# Patient Record
Sex: FEMALE | Race: WHITE | NOT HISPANIC OR LATINO | Employment: FULL TIME | URBAN - METROPOLITAN AREA
[De-identification: names, ages, dates, MRNs, and addresses within clinical notes are randomized per-mention and may not be internally consistent; named-entity substitution may affect disease eponyms.]

---

## 2017-03-28 ENCOUNTER — TELEPHONE (OUTPATIENT)
Dept: PULMONOLOGY | Facility: HOSPICE | Age: 42
End: 2017-03-28

## 2017-03-28 DIAGNOSIS — R06.00 DYSPNEA, UNSPECIFIED TYPE: ICD-10-CM

## 2019-04-26 ENCOUNTER — OFFICE VISIT (OUTPATIENT)
Dept: URGENT CARE | Facility: PHYSICIAN GROUP | Age: 44
End: 2019-04-26
Payer: MEDICARE

## 2019-04-26 VITALS
DIASTOLIC BLOOD PRESSURE: 76 MMHG | TEMPERATURE: 98.6 F | OXYGEN SATURATION: 96 % | RESPIRATION RATE: 16 BRPM | WEIGHT: 180 LBS | SYSTOLIC BLOOD PRESSURE: 110 MMHG | HEART RATE: 80 BPM | BODY MASS INDEX: 29.95 KG/M2

## 2019-04-26 DIAGNOSIS — S91.112A LACERATION OF LEFT GREAT TOE WITHOUT FOREIGN BODY PRESENT OR DAMAGE TO NAIL, INITIAL ENCOUNTER: ICD-10-CM

## 2019-04-26 PROCEDURE — 90715 TDAP VACCINE 7 YRS/> IM: CPT | Performed by: PHYSICIAN ASSISTANT

## 2019-04-26 PROCEDURE — 90471 IMMUNIZATION ADMIN: CPT | Performed by: PHYSICIAN ASSISTANT

## 2019-04-26 PROCEDURE — 12002 RPR S/N/AX/GEN/TRNK2.6-7.5CM: CPT | Mod: TA | Performed by: PHYSICIAN ASSISTANT

## 2019-04-26 RX ORDER — HYDROXYZINE HYDROCHLORIDE 25 MG/1
TABLET, FILM COATED ORAL
Refills: 2 | COMMUNITY
Start: 2019-03-22 | End: 2019-06-11 | Stop reason: SDUPTHER

## 2019-04-26 RX ORDER — AMOXICILLIN AND CLAVULANATE POTASSIUM 875; 125 MG/1; MG/1
1 TABLET, FILM COATED ORAL 2 TIMES DAILY
Qty: 10 TAB | Refills: 0 | Status: SHIPPED | OUTPATIENT
Start: 2019-04-26 | End: 2019-05-01

## 2019-04-26 RX ORDER — FLUOXETINE HYDROCHLORIDE 60 MG/1
TABLET, FILM COATED ORAL; ORAL
Refills: 3 | COMMUNITY
Start: 2019-03-22 | End: 2019-06-11 | Stop reason: SDUPTHER

## 2019-04-26 RX ORDER — SIMVASTATIN 40 MG
TABLET ORAL
Refills: 3 | COMMUNITY
Start: 2019-03-22 | End: 2019-06-11 | Stop reason: SDUPTHER

## 2019-04-26 RX ORDER — MONTELUKAST SODIUM 10 MG/1
TABLET ORAL
Refills: 5 | COMMUNITY
Start: 2019-03-22 | End: 2019-06-11 | Stop reason: SDUPTHER

## 2019-04-26 RX ORDER — FUROSEMIDE 20 MG/1
TABLET ORAL
Refills: 5 | COMMUNITY
Start: 2019-03-22 | End: 2019-06-11 | Stop reason: SDUPTHER

## 2019-04-26 RX ORDER — CYCLOBENZAPRINE HCL 10 MG
TABLET ORAL
Refills: 6 | COMMUNITY
Start: 2019-03-22 | End: 2019-06-11 | Stop reason: SDUPTHER

## 2019-04-26 ASSESSMENT — PAIN SCALES - GENERAL: PAINLEVEL: 3=SLIGHT PAIN

## 2019-04-26 NOTE — PROGRESS NOTES
Chief Complaint   Patient presents with   • Laceration     Left big toe, patient kicked a door        HISTORY OF PRESENT ILLNESS: Patient is a 44 y.o. female who presents today for the following:    Patient comes in for evaluation of a laceration on her left just prior to arrival.  Her toe got caught under a door that was being opened.  Patient denies distal paresthesias.  Her last tetanus shot is unknown.    Patient Active Problem List    Diagnosis Date Noted   • Bipolar disorder ([facility]) 01/07/2013   • COPD (chronic obstructive pulmonary disease) ([facility]) 11/08/2011   • Fibrocystic breast disease 11/08/2011   • Hx of cervical malignancy 11/08/2011   • Dyspepsia 11/08/2011       Allergies:Nkda [no known drug allergy]    Current Outpatient Prescriptions Ordered in [facility]   Medication Sig Dispense Refill   • BREO ELLIPTA 200-25 MCG/INH AEROSOL POWDER, BREATH ACTIVATED   6   • simvastatin (ZOCOR) 40 MG Tab   3   • hydrOXYzine HCl (ATARAX) 25 MG Tab   2   • FLUoxetine HCl 60 MG Tab   3   • cyclobenzaprine (FLEXERIL) 10 MG Tab   6   • montelukast (SINGULAIR) 10 MG Tab   5   • furosemide (LASIX) 20 MG Tab   5   • NEXIUM 40 MG delayed-release capsule   3   • amoxicillin-clavulanate (AUGMENTIN) 875-125 MG Tab Take 1 Tab by mouth 2 times a day for 5 days. 10 Tab 0   • albuterol (VENTOLIN OR PROVENTIL) 108 (90 BASE) MCG/ACT AERS Inhale 2 Puffs by mouth every 6 hours as needed for Shortness of Breath. (Patient not taking: Reported on 4/26/2019) 1 Inhaler 0   • fluticasone-salmeterol (ADVAIR) 500-50 MCG/DOSE AEPB Inhale 1 Puff by mouth every 12 hours. (Patient not taking: Reported on 4/26/2019) 1 Inhaler 1   • tiotropium (SPIRIVA HANDIHALER) 18 MCG CAPS Inhale 1 Cap by mouth every day. (Patient not taking: Reported on 4/26/2019) 30 Cap 5   • esomeprazole (NEXIUM) 20 MG capsule Take 1 Cap by mouth every morning before breakfast. (Patient not taking: Reported on 4/26/2019) 30 Cap 5   • doxycycline (VIBRAMYCIN) 100 MG TABS Take 1 Tab by  mouth 2 times a day. (Patient not taking: Reported on 2019) 20 Tab 0   • methylPREDNISolone (MEDROL, PADMA,) 4 MG tablet follow package directions (Patient not taking: Reported on 2019) 21 Tab 0     No current Epic-ordered facility-administered medications on file.        Past Medical History:   Diagnosis Date   • Asthma    • COPD (chronic obstructive pulmonary disease) 2011   • Fibrocystic breast    • Fibrocystic breast disease 2011   • Hx of cervical malignancy 2011       Social History   Substance Use Topics   • Smoking status: Former Smoker     Packs/day: 0.50     Quit date: 2011   • Smokeless tobacco: Not on file   • Alcohol use Yes      Comment: rare       Family Status   Relation Status   • Mo Alive   • Fa Alive   • Sis Alive   • Bro Alive   • Sis Alive   • Sis Alive   • Bro    • MGMo      Family History   Problem Relation Age of Onset   • Hypertension Unknown    • Cancer Unknown    • Lung Disease Unknown    • Lung Disease Mother    • Cancer Mother         cervical   • Lung Disease Father    • Cancer Maternal Grandmother         breast       Review of Systems:   Constitutional ROS: No unexpected change in weight, No weakness, No fatigue  Extremities: Laceration left great toe.      Exam:  /76   Pulse 80   Temp 37 °C (98.6 °F) (Temporal)   Resp 16   Wt 81.6 kg (180 lb)   SpO2 96%   General: Well developed, well nourished. No distress.  Pulmonary: Unlabored respiratory effort.    Cardiovascular: Brisk capillary refill in the left great toe.  Extremities: 3.5 cm laceration on the dorsum of the left great toe over the IP joint.  Actively bleeding.  Psych: Normal mood. Alert and oriented x3. Judgment and insight is normal.    LACERATION REPAIR PROCEDURE NOTE  The patient's identification was confirmed and consent was obtained.  This procedure was performed by Sho Zuñiga PA-C at 1600 hrs  Site: Left great toe  Sterile procedures observed  Anesthetic used  (type and amt): 1% lidocaine without epinephrine, 8 mL  Suture type/size: 3-0 Ethilon  Length: 3.5 cm  # of Sutures/staples: #5 interrupted    Complexity: Simple     Tetanus: Updated in clinic today    Assessment/Plan:  Discussed wound care at home.  Return to clinic in 7 to 10 days for suture removal, sooner for any signs of infection. Contingent antibiotic prescription given to patient to fill upon meeting criteria of guidelines discussed.   1. Laceration of left great toe without foreign body present or damage to nail, initial encounter  Tdap =>6yo IM    amoxicillin-clavulanate (AUGMENTIN) 875-125 MG Tab

## 2019-06-11 ENCOUNTER — TELEPHONE (OUTPATIENT)
Dept: SCHEDULING | Facility: IMAGING CENTER | Age: 44
End: 2019-06-11

## 2019-06-11 ENCOUNTER — OFFICE VISIT (OUTPATIENT)
Dept: MEDICAL GROUP | Facility: PHYSICIAN GROUP | Age: 44
End: 2019-06-11
Payer: MEDICARE

## 2019-06-11 VITALS
OXYGEN SATURATION: 98 % | WEIGHT: 180.34 LBS | HEART RATE: 78 BPM | BODY MASS INDEX: 30.79 KG/M2 | HEIGHT: 64 IN | TEMPERATURE: 97.7 F | SYSTOLIC BLOOD PRESSURE: 104 MMHG | DIASTOLIC BLOOD PRESSURE: 76 MMHG | RESPIRATION RATE: 16 BRPM

## 2019-06-11 DIAGNOSIS — J44.9 CHRONIC OBSTRUCTIVE PULMONARY DISEASE, UNSPECIFIED COPD TYPE (HCC): ICD-10-CM

## 2019-06-11 DIAGNOSIS — F41.9 ANXIETY: Primary | ICD-10-CM

## 2019-06-11 DIAGNOSIS — K21.9 GASTROESOPHAGEAL REFLUX DISEASE WITHOUT ESOPHAGITIS: ICD-10-CM

## 2019-06-11 DIAGNOSIS — R60.9 WATER RETENTION: ICD-10-CM

## 2019-06-11 DIAGNOSIS — F32.A DEPRESSION, UNSPECIFIED DEPRESSION TYPE: ICD-10-CM

## 2019-06-11 DIAGNOSIS — Z83.3 FAMILY HISTORY OF DIABETES MELLITUS: ICD-10-CM

## 2019-06-11 DIAGNOSIS — F31.60 BIPOLAR AFFECTIVE DISORDER, CURRENT EPISODE MIXED, CURRENT EPISODE SEVERITY UNSPECIFIED (HCC): ICD-10-CM

## 2019-06-11 DIAGNOSIS — E78.5 HYPERLIPIDEMIA, UNSPECIFIED HYPERLIPIDEMIA TYPE: ICD-10-CM

## 2019-06-11 DIAGNOSIS — M62.838 MUSCLE SPASM OF LEFT SHOULDER AREA: ICD-10-CM

## 2019-06-11 DIAGNOSIS — E55.9 VITAMIN D DEFICIENCY: ICD-10-CM

## 2019-06-11 DIAGNOSIS — Z87.42 HISTORY OF ENDOMETRIOSIS: ICD-10-CM

## 2019-06-11 DIAGNOSIS — N63.20 LEFT BREAST LUMP: ICD-10-CM

## 2019-06-11 DIAGNOSIS — Z76.89 ENCOUNTER TO ESTABLISH CARE: ICD-10-CM

## 2019-06-11 DIAGNOSIS — R06.02 SOB (SHORTNESS OF BREATH): ICD-10-CM

## 2019-06-11 PROCEDURE — 99215 OFFICE O/P EST HI 40 MIN: CPT | Performed by: NURSE PRACTITIONER

## 2019-06-11 RX ORDER — ALBUTEROL SULFATE 90 UG/1
2 AEROSOL, METERED RESPIRATORY (INHALATION) EVERY 6 HOURS PRN
Qty: 1 INHALER | Refills: 0 | Status: SHIPPED | OUTPATIENT
Start: 2019-06-11 | End: 2019-08-24 | Stop reason: SDUPTHER

## 2019-06-11 RX ORDER — METHYLPREDNISOLONE 4 MG/1
4 TABLET ORAL DAILY
Qty: 30 TAB | Refills: 0 | Status: SHIPPED | OUTPATIENT
Start: 2019-06-11 | End: 2019-06-24 | Stop reason: CLARIF

## 2019-06-11 RX ORDER — SIMVASTATIN 40 MG
40 TABLET ORAL EVERY EVENING
Qty: 30 TAB | Refills: 3 | Status: SHIPPED | OUTPATIENT
Start: 2019-06-11 | End: 2020-02-27

## 2019-06-11 RX ORDER — ESOMEPRAZOLE MAGNESIUM 40 MG/1
40 CAPSULE, DELAYED RELEASE ORAL
Qty: 30 CAP | Refills: 6 | Status: SHIPPED | OUTPATIENT
Start: 2019-06-11 | End: 2019-06-18

## 2019-06-11 RX ORDER — CYCLOBENZAPRINE HCL 10 MG
10 TABLET ORAL 2 TIMES DAILY PRN
Qty: 30 TAB | Refills: 6 | Status: SHIPPED | OUTPATIENT
Start: 2019-06-11 | End: 2019-06-18 | Stop reason: SDUPTHER

## 2019-06-11 RX ORDER — FLUOXETINE HYDROCHLORIDE 60 MG/1
60 TABLET, FILM COATED ORAL; ORAL DAILY
Qty: 30 TAB | Refills: 3 | Status: SHIPPED | OUTPATIENT
Start: 2019-06-11 | End: 2019-06-11 | Stop reason: SDUPTHER

## 2019-06-11 RX ORDER — PROPRANOLOL HYDROCHLORIDE 20 MG/1
20 TABLET ORAL DAILY
Qty: 30 TAB | Refills: 1 | Status: SHIPPED | OUTPATIENT
Start: 2019-06-11 | End: 2019-08-28 | Stop reason: SDUPTHER

## 2019-06-11 RX ORDER — METHYLPREDNISOLONE 4 MG/1
4 TABLET ORAL DAILY
Qty: 30 TAB | Refills: 0 | Status: SHIPPED | OUTPATIENT
Start: 2019-06-11 | End: 2021-06-03

## 2019-06-11 RX ORDER — MONTELUKAST SODIUM 10 MG/1
10 TABLET ORAL DAILY
Qty: 30 TAB | Refills: 5 | Status: SHIPPED | OUTPATIENT
Start: 2019-06-11 | End: 2021-06-03 | Stop reason: SDUPTHER

## 2019-06-11 RX ORDER — FLUOXETINE HYDROCHLORIDE 60 MG/1
60 TABLET, FILM COATED ORAL; ORAL DAILY
Qty: 30 TAB | Refills: 3 | Status: SHIPPED | OUTPATIENT
Start: 2019-06-11 | End: 2020-02-27

## 2019-06-11 RX ORDER — FUROSEMIDE 20 MG/1
20 TABLET ORAL DAILY
Qty: 60 TAB | Refills: 5 | Status: SHIPPED | OUTPATIENT
Start: 2019-06-11 | End: 2019-06-18 | Stop reason: SDUPTHER

## 2019-06-11 RX ORDER — HYDROXYZINE HYDROCHLORIDE 25 MG/1
25 TABLET, FILM COATED ORAL 2 TIMES DAILY
Qty: 30 TAB | Refills: 2 | Status: SHIPPED | OUTPATIENT
Start: 2019-06-11 | End: 2019-12-17

## 2019-06-12 NOTE — ASSESSMENT & PLAN NOTE
This is a new problem to this provider.  Patient admits to chest discomfort and shortness of breath.  She reports being diagnosed with severe COPD years ago, she has not been followed by P[facility] in a long time.  She reports her last PFTs were done approximately 3 years ago.  She has run out of her COPD medications.  She admits to intermittent wheezing.  She was diagnosed with asthma as a child.  Family history of severe COPD in both parents.  Patient is a current smoker, however has been trying to cut down from a pack a day to 4 cigarettes a day.  Patient would like to quit, however states she is under a lot of stress and she has to be mentally prepare to quit.  She would like to address this next visit.   Normal

## 2019-06-12 NOTE — ASSESSMENT & PLAN NOTE
This is a new problem to this provider.  Patient reports edema in bilateral ankles daily.  She admits to on and off chest discomfort and shortness of breath.  She attributes the symptoms to her severe COPD.  Patient was not followed by a provider in a long time.  Today she is requesting refills on her current medications.  Patient denies chest pain or shortness of breath today.  She has not been diagnosed with heart failure in the past.

## 2019-06-12 NOTE — ASSESSMENT & PLAN NOTE
This is a new issue to this provider.  Patient was diagnosed in 2004.  Has not been on medication for long time.

## 2019-06-12 NOTE — PROGRESS NOTES
Chief Complaint   Patient presents with   • Establish Care     med refills       HISTORY OF PRESENT ILLNESS: Patient is a 44 y.o. female, established patient who presents today to discuss medical problems as listed below:    Health Maintenance:  COMPLETED.    Water retention  This is a new problem to this provider.  Patient reports edema in bilateral ankles daily.  She admits to on and off chest discomfort and shortness of breath.  She attributes the symptoms to her severe COPD.  Patient was not followed by a provider in a long time.  Today she is requesting refills on her current medications.  Patient denies chest pain or shortness of breath today.  She has not been diagnosed with heart failure in the past.    SOB (shortness of breath)  This is a new problem to this provider.  Patient admits to chest discomfort and shortness of breath.  She reports being diagnosed with severe COPD years ago, she has not been followed by PMA in a long time.  She reports her last PFTs were done approximately 3 years ago.  She has run out of her COPD medications.  She admits to intermittent wheezing.  She was diagnosed with asthma as a child.  Family history of severe COPD in both parents.  Patient is a current smoker, however has been trying to cut down from a pack a day to 4 cigarettes a day.  Patient would like to quit, however states she is under a lot of stress and she has to be mentally prepare to quit.  She would like to address this next visit.    Muscle spasm of left shoulder area  This is a new issue to this provider.  Patient reports spasmatic pains in left shouldersince ORIF of the left shoulder 4 years ago.  She is taking Flexeril 10 mg twice daily which controls her pain.  She denies restrictions in range of motion.    COPD (chronic obstructive pulmonary disease)  This is a new issue to this provider.  Patient states she ran out of her medications 3 weeks ago.  She would like refills today, as well as referrals to  pulmonologist.  Patient states her last PFTs were completed 3 years ago, records unavailable at this time.  Patient states her doctor is not in practice any longer.    Bipolar disorder  This is a new issue to this provider.  Patient was diagnosed in 2004.  Has not been on medication for long time.     Left breast lump  This is a new problem to this provider.  History of breast cancer in 2009, status post bilateral mastectomy and reconstruction.  Patient reports feeling a lump in her right breast that feels like implant dislodging.  She denies pain.  No mammograms since 2009.    Anxiety  This is a new problem to this provider.  Patient reports chronic anxiety which she has been managing with fluoxetine 60 mg daily hydroxyzine 25 mg twice daily.  Patient noted when she takes it during the day it makes her tired and sleepy.  Now she is taking it nightly.  Patient would like better control of her anxiety during the day with medications that do not make her sleepy.      Patient Active Problem List    Diagnosis Date Noted   • Encounter to establish care 06/11/2019   • Family history of diabetes mellitus 06/11/2019   • Vitamin D deficiency 06/11/2019   • Hyperlipidemia 06/11/2019   • History of endometriosis 06/11/2019   • Anxiety 06/11/2019   • Muscle spasm of left shoulder area 06/11/2019   • Water retention 06/11/2019   • GERD (gastroesophageal reflux disease) 06/11/2019   • Depression 06/11/2019   • SOB (shortness of breath) 06/11/2019   • Left breast lump 06/11/2019   • Bipolar disorder ([facility]) 01/07/2013   • COPD (chronic obstructive pulmonary disease) ([facility]) 11/08/2011   • Fibrocystic breast disease 11/08/2011   • Hx of cervical malignancy 11/08/2011   • Dyspepsia 11/08/2011        Allergies: Nkda [no known drug allergy]    Current Outpatient Prescriptions   Medication Sig Dispense Refill   • vitamin D (CHOLECALCIFEROL) 1000 UNIT Tab Take 1,000 Units by mouth every day.     • hydrOXYzine HCl (ATARAX) 25 MG Tab Take 1  Tab by mouth 2 Times a Day. 30 Tab 2   • cyclobenzaprine (FLEXERIL) 10 MG Tab Take 1 Tab by mouth 2 times a day as needed. 30 Tab 6   • methylPREDNISolone (MEDROL) 4 MG Tab Take 1 Tab by mouth every day. 30 Tab 0   • methylPREDNISolone (MEDROL) 4 MG Tab Take 1 Tab by mouth every day. 30 Tab 0   • simvastatin (ZOCOR) 40 MG Tab Take 1 Tab by mouth every evening. 30 Tab 3   • montelukast (SINGULAIR) 10 MG Tab Take 1 Tab by mouth every day. 30 Tab 5   • furosemide (LASIX) 20 MG Tab Take 1 Tab by mouth every day. 60 Tab 5   • esomeprazole (NEXIUM) 40 MG delayed-release capsule Take 1 Cap by mouth every morning before breakfast. 30 Cap 6   • albuterol 108 (90 Base) MCG/ACT Aero Soln inhalation aerosol Inhale 2 Puffs by mouth every 6 hours as needed for Shortness of Breath. 1 Inhaler 0   • fluticasone-salmeterol (ADVAIR) 500-50 MCG/DOSE AEROSOL POWDER, BREATH ACTIVATED Inhale 1 Puff by mouth every 12 hours. 1 Inhaler 4   • propranolol (INDERAL) 20 MG Tab Take 1 Tab by mouth every day. 30 Tab 1   • FLUoxetine HCl 60 MG Tab Take 60 Tabs by mouth every day. 30 Tab 3   • BREO ELLIPTA 200-25 MCG/INH AEROSOL POWDER, BREATH ACTIVATED   6   • NEXIUM 40 MG delayed-release capsule   3   • fluticasone-salmeterol (ADVAIR) 500-50 MCG/DOSE AEPB Inhale 1 Puff by mouth every 12 hours. (Patient not taking: Reported on 4/26/2019) 1 Inhaler 1   • esomeprazole (NEXIUM) 20 MG capsule Take 1 Cap by mouth every morning before breakfast. (Patient not taking: Reported on 4/26/2019) 30 Cap 5   • methylPREDNISolone (MEDROL, PADMA,) 4 MG tablet follow package directions (Patient not taking: Reported on 4/26/2019) 21 Tab 0     No current facility-administered medications for this visit.        Social History   Substance Use Topics   • Smoking status: Current Every Day Smoker     Packs/day: 0.50     Last attempt to quit: 2/8/2011   • Smokeless tobacco: Never Used   • Alcohol use Yes      Comment: rare     Social History     Social History Narrative   •  "No narrative on file       Family History   Problem Relation Age of Onset   • Lung Disease Mother    • Cancer Mother         cervical   • Lung Disease Father    • Cancer Maternal Grandmother 70        throat   • Hypertension Unknown    • Cancer Unknown    • Lung Disease Unknown        Allergies, past medical history, past surgical history, family history, social history reviewed and updated.    Review of Systems:      - Constitutional: Negative for fever, chills, unexpected weight change, and fatigue/generalized weakness.     - HEENT: Negative for headaches, vision changes, hearing changes, ear pain, ear discharge, rhinorrhea, sinus congestion, sore throat, and neck pain.      - Respiratory: Positive for chronic dry cough, no sputum production, occasional chest congestion, dyspnea, wheezing, no crackles crackles.      - Cardiovascular: Positive for bilateral lower extremity edema.   negative for chest pain, palpitations, orthopnea.    - Gastrointestinal:Negative for heartburn, nausea, vomiting, abdominal pain, hematochezia, melena, diarrhea, constipation, and greasy/foul-smelling stools.     - Genitourinary: Negative for dysuria, polyuria, hematuria, pyuria, urinary urgency, and urinary incontinence.    - Musculoskeletal: Positive for chronic left shoulder pain.    - Skin: Negative for rash, itching, cyanotic skin color change.     - Neurological: Negative for dizziness, tingling, tremors, focal sensory deficit, focal weakness and headaches.     - Endo/Heme/Allergies: Does not bruise/bleed easily.     - Psychiatric/Behavioral: Negative for depression, suicidal/homicidal ideation and memory loss today.      All other systems reviewed and are negative    Exam:    /76 (BP Location: Right arm, Patient Position: Sitting, BP Cuff Size: Adult)   Pulse 78   Temp 36.5 °C (97.7 °F) (Temporal)   Resp 16   Ht 1.626 m (5' 4\")   Wt 81.8 kg (180 lb 5.4 oz)   SpO2 98%   BMI 30.95 kg/m²  Body mass index is 30.95 " kg/m².    Physical Exam:  Constitutional: Well-developed and well-nourished. Not diaphoretic.  Mildly anxious today.    Skin: Skin is warm and dry. No rash noted.  Head: Atraumatic without lesions.  Eyes: Conjunctivae and extraocular motions are normal. Pupils are equal, round, and reactive to light. No scleral icterus.   Ears:  External ears unremarkable. Tympanic membranes clear and intact.  Nose: Nares patent. Septum midline. Turbinates without erythema nor edema. No discharge.   Mouth/Throat: Dentition is normal. Tongue normal. Oropharynx is clear and moist. Posterior pharynx without erythema or exudates.  Neck: Supple, trachea midline. Normal range of motion. No thyromegaly present. No lymphadenopathy--cervical or supraclavicular.  Cardiovascular: Regular rate and rhythm, S1 and S2 without murmur, rubs, or gallops.    Chest: Effort normal. Clear to auscultation throughout. No adventitious sounds. No CVA tenderness.  Abdomen: Soft, non tender, and without distention. Active bowel sounds in all four quadrants. No rebound, guarding, masses or HSM.  : Negative for dysuria, polyuria, hematuria, pyuria, urinary urgency, and urinary incontinence.  Extremities: No cyanosis, clubbing, erythema, nor edema. Distal pulses intact and symmetric.   Musculoskeletal: All major joints AROM full in all directions without pain.  Neurological: Alert and oriented x 3. DTRs 2+/3 and symmetric.   Psychiatric:  Behavior, mood, and affect are appropriate.    Patient was seen for 50 minutes face to face of which, 45 minutes was spent counseling regarding the COPD care, smoking cessation.    Assessment/Plan:  1. Encounter to establish care    2. Family history of diabetes mellitus  - CBC WITH DIFFERENTIAL; Future  - Comp Metabolic Panel; Future  - FREE THYROXINE; Future  - TSH; Future  - Lipid Profile; Future  - HEMOGLOBIN A1C; Future    3. Vitamin D deficiency  Patient is currently taking 1000 mg of OTC vitamin D daily.  Continue  current medication.  - VITAMIN D,25 HYDROXY; Future    4. Hyperlipidemia, unspecified hyperlipidemia type  - CBC WITH DIFFERENTIAL; Future  - Comp Metabolic Panel; Future  - Lipid Profile; Future  - simvastatin (ZOCOR) 40 MG Tab; Take 1 Tab by mouth every evening.  Dispense: 30 Tab; Refill: 3    5. History of endometriosis  Uncontrolled.  Patient reports total hysterectomy, however unsure if she still has her ovaries.  No record available.  Patient needs further evaluation and management imaging, referral to OB/GYN placed.  - REFERRAL TO OB/GYN    6. Chronic obstructive pulmonary disease, unspecified COPD type (HCC)  Uncontrolled.  Patient needs further evaluation and management.  No recent records available.  PFTs ordered, referral to pulmonology placed.  Refills given.  Patient educated on COPD management, patient advised to go to ED if experiencing chest pain, shortness of breath and wheezing.  - REFERRAL TO PULMONOLOGY  - methylPREDNISolone (MEDROL) 4 MG Tab; Take 1 Tab by mouth every day.  Dispense: 30 Tab; Refill: 0  - methylPREDNISolone (MEDROL) 4 MG Tab; Take 1 Tab by mouth every day.  Dispense: 30 Tab; Refill: 0  - montelukast (SINGULAIR) 10 MG Tab; Take 1 Tab by mouth every day.  Dispense: 30 Tab; Refill: 5  - albuterol 108 (90 Base) MCG/ACT Aero Soln inhalation aerosol; Inhale 2 Puffs by mouth every 6 hours as needed for Shortness of Breath.  Dispense: 1 Inhaler; Refill: 0  - fluticasone-salmeterol (ADVAIR) 500-50 MCG/DOSE AEROSOL POWDER, BREATH ACTIVATED; Inhale 1 Puff by mouth every 12 hours.  Dispense: 1 Inhaler; Refill: 4  - PULMONARY FUNCTION TESTS -Test requested: Complete Pulmonary Function Test; Future    7. Left breast lump  Will review imaging.  - US-BREAST BILAT-COMPLETE; Future    8. Anxiety  Uncontrolled, stable.  Continue hydroxyzine nightly.  Start propranolol 20 mg in a.m. report any side effects of fatigue or dizziness.  - hydrOXYzine HCl (ATARAX) 25 MG Tab; Take 1 Tab by mouth 2 Times a  Day.  Dispense: 30 Tab; Refill: 2  - propranolol (INDERAL) 20 MG Tab; Take 1 Tab by mouth every day.  Dispense: 30 Tab; Refill: 1    9. Muscle spasm of left shoulder area  Stable.  Take Flexeril as needed.  Any future may require physical therapy.  Will address next visit  - cyclobenzaprine (FLEXERIL) 10 MG Tab; Take 1 Tab by mouth 2 times a day as needed.  Dispense: 30 Tab; Refill: 6    10. Water retention  Uncontrolled, stable.  Continue Lasix as prescribed.  - furosemide (LASIX) 20 MG Tab; Take 1 Tab by mouth every day.  Dispense: 60 Tab; Refill: 5    11. Gastroesophageal reflux disease without esophagitis  Stable.  Continue current medications.  - esomeprazole (NEXIUM) 40 MG delayed-release capsule; Take 1 Cap by mouth every morning before breakfast.  Dispense: 30 Cap; Refill: 6    12. Depression, unspecified depression type  Stable.  Continue current medication.  Patient denies suicidal homicidal ideation today.  - FLUoxetine HCl 60 MG Tab; Take 60 Tabs by mouth every day.  Dispense: 30 Tab; Refill: 3    13. SOB (shortness of breath)  Uncontrolled.  Stable today, denies chest pain or shortness of breath in the office today.  Pulse oximetry is 98%.  Needs further evaluation for heart failure.  Will review imaging, referral to cardiology placed.  - EC-ECHOCARDIOGRAM COMPLETE W/O CONT; Future  - REFERRAL TO CARDIOLOGY    14. Bipolar affective disorder, current episode mixed, current episode severity unspecified (HCC)  Uncontrolled, stable.  Needs further evaluation and management by psychiatry, referral placed.  Patient denies being manic today.  - REFERRAL TO PSYCHIATRY    Discussed with patient possible alternative diagnoses, pt is to take all medications as prescribed. If symptoms persist FU w/PCP, if symptoms worsen go to emergency room.  Reviewed indication, dosage, usage and potential adverse effects of prescribed medications. Reviewed risks and benefits of treatment plan. Patient verbalizes understanding of  all instruction and verbally agrees to plan.    Return in about 3 weeks (around 7/2/2019).

## 2019-06-12 NOTE — ASSESSMENT & PLAN NOTE
This is a new problem to this provider.  History of breast cancer in 2009, status post bilateral mastectomy and reconstruction.  Patient reports feeling a lump in her right breast that feels like implant dislodging.  She denies pain.  No mammograms since 2009.

## 2019-06-12 NOTE — ASSESSMENT & PLAN NOTE
This is a new issue to this provider.  Patient states she ran out of her medications 3 weeks ago.  She would like refills today, as well as referrals to pulmonologist.  Patient states her last PFTs were completed 3 years ago, records unavailable at this time.  Patient states her doctor is not in practice any longer.

## 2019-06-12 NOTE — ASSESSMENT & PLAN NOTE
This is a new problem to this provider.  Patient reports chronic anxiety which she has been managing with fluoxetine 60 mg daily hydroxyzine 25 mg twice daily.  Patient noted when she takes it during the day it makes her tired and sleepy.  Now she is taking it nightly.  Patient would like better control of her anxiety during the day with medications that do not make her sleepy.

## 2019-06-12 NOTE — ASSESSMENT & PLAN NOTE
This is a new issue to this provider.  Patient reports spasmatic pains in left shouldersince ORIF of the left shoulder 4 years ago.  She is taking Flexeril 10 mg twice daily which controls her pain.  She denies restrictions in range of motion.

## 2019-06-14 ASSESSMENT — ENCOUNTER SYMPTOMS
RESPIRATORY SYMPTOMS COMMENTS: YES
HEMOPTYSIS: 0
SHORTNESS OF BREATH: 1
WHEEZING: 1
CHEST TIGHTNESS: 1
DYSPNEA AT REST: 1

## 2019-06-17 ENCOUNTER — GYNECOLOGY VISIT (OUTPATIENT)
Dept: OBGYN | Facility: MEDICAL CENTER | Age: 44
End: 2019-06-17
Payer: MEDICARE

## 2019-06-17 ENCOUNTER — TELEPHONE (OUTPATIENT)
Dept: MEDICAL GROUP | Facility: PHYSICIAN GROUP | Age: 44
End: 2019-06-17

## 2019-06-17 VITALS
HEIGHT: 64 IN | WEIGHT: 181 LBS | BODY MASS INDEX: 30.9 KG/M2 | DIASTOLIC BLOOD PRESSURE: 60 MMHG | SYSTOLIC BLOOD PRESSURE: 100 MMHG

## 2019-06-17 DIAGNOSIS — R10.2 PELVIC PAIN: ICD-10-CM

## 2019-06-17 DIAGNOSIS — Z87.42 HISTORY OF ENDOMETRIOSIS: ICD-10-CM

## 2019-06-17 PROCEDURE — 99203 OFFICE O/P NEW LOW 30 MIN: CPT | Performed by: OBSTETRICS & GYNECOLOGY

## 2019-06-17 NOTE — TELEPHONE ENCOUNTER
----- Message from Ynes Beard sent at 6/15/2019  1:33 PM PDT -----  Regarding: Prescription Question  Contact: [phone]  I wasnt able to  my nexium because my insurance doesnt cover generic only brand name. The pharmacy said i need a new prescription for nexium not the off brand.

## 2019-06-17 NOTE — PROGRESS NOTES
Chief Complaint   Patient presents with   • New Patient     new patient    chief complaint: Pelvic pain    History of present illness:   44 y.o.  presents with above chief complaint.  Patient reports worsening pelvic pain for the last year and a half or so.  It is located in the left lower quadrant mostly with some radiation to the right lower quadrant.  She reports it is a 5 out of 10 in intensity.  Intensity of the pain has not changed in the last year and a half.  It is worsened with intercourse no alleviating or associated factors.  She has tried acetaminophen, ibuprofen as well as Midol to control her symptoms.  These have not decreased her pain.    Patient has a history hysterectomy in  to severe endometriosis.  She believed that her ovaries were taken of that time, but she had recently undergone gallbladder and appendix surgery, and the surgeon reported that her ovaries were still there.  Patient also has a history of breast cancer in the past, underwent bilateral mastectomy with reconstruction.    ROS: Pertinent positives documented in HPI and all other systems reviewed & are negative    POBHx:  4 para 2-1-1-3.  Vaginal delivery x3    PGYNHx: History endometriosis, last pap unsure.  Denies history of abnormal Paps in the past    All PMH, PSH, meds, allergies, social history and FH reviewed and updated today:  Past Medical History:   Diagnosis Date   • Anemia    • Anxiety    • Arthritis    • ASTHMA    • Bipolar 1 disorder (HCC)    • Bronchitis    • Cancer (HCC)     br CA   • COPD (chronic obstructive pulmonary disease) (HCC)    • Fibrocystic breast    • Fibrocystic breast disease 2011   • Hemorrhoids    • Hx of cervical malignancy 2011   • Hyperlipidemia    • Migraine    • Pneumonia        Past Surgical History:   Procedure Laterality Date   • BREAST IMPLANT REVISION  2012    Performed by ADAM CHOWDHURY at [facility]   • CAPSULECTOMY  2011  "   Performed by ADAM CHOWDHURY at [facility]   • BREAST IMPLANT REVISION  11/22/2011    Performed by ADAM CHOWDHURY at [facility]   • BREAST IMPLANT REMOVAL  2/13/2009    Performed by ANASTASIA WILKERSON at [facility]   • BREAST IMPLANT REVISION  2/13/2009    Performed by ANASTASIA WILKERSON at [facility]   • CAPSULECTOMY  2/13/2009    Performed by A[facility]STASIA WILKERSON at [facility]   • HYSTERECTOMY, TOTAL ABDOMINAL  2004   • MASTECTOMY  2001    with reduction   • ABDOMINAL HYSTERECTOMY TOTAL     • APPENDECTOMY     • CHOLECYSTECTOMY         Allergies:   Allergies   Allergen Reactions   • Nkda [No Known Drug Allergy]        Social History     Social History   • Marital status:      Spouse name: N/A   • Number of children: N/A   • Years of education: N/A     Occupational History   •       Social History Main Topics   • Smoking status: Current Every Day Smoker     Packs/day: 0.50     Last attempt to quit: 2/8/2011   • Smokeless tobacco: Never Used   • Alcohol use No   • Drug use: No   • Sexual activity: Yes     Partners: Male     Other Topics Concern   • Not on file     Social History Narrative   • No narrative on file       Family History   Problem Relation Age of Onset   • Lung Disease Mother    • Cancer Mother         cervical   • Heart Disease Mother    • Hypertension Mother    • Other Mother         migraines   • Lung Disease Father    • Asthma Father    • Psychiatry Father    • Cancer Sister         lung   • Cancer Maternal Grandmother 70        throat   • Hypertension Unknown    • Cancer Unknown    • Lung Disease Unknown    • Heart Disease Maternal Uncle    • Lung Disease Maternal Uncle    • Anemia Daughter        Physical exam:  /60 (BP Location: Left arm, Patient Position: Sitting)   Ht 1.626 m (5' 4\")   Wt 82.1 kg (181 lb)     GENERAL APPEARANCE: healthy, alert, no distress  ABDOMEN Abdomen soft, mild test palpation " in left lower quadrant. BS normal. No masses,  No organomegaly  FEMALE GYN: normal female external genitalia without lesions, BUS normal without lesions, vaginal mucosa pink and moist, no vaginal discharge noted, cervix absent, urethra is normal without discharge or scarring, no bladder fullness or masses, normal anus and perineum.  There is significant dense palpation at the cuff, especially on the left side.  Questionable fullness palpated in the left lower quadrant, possible cyst.  Positive tender palpation left lower quadrant, no tender palpation right lower quadrant. no palpable masses.  No inguinal hernia present .  EXTREMITIES:negative clubbing, cyanosis, edema    NEURO Awake, alert and oriented x 3, Normal gait, no sensory deficits  SKIN No rashes, or ulcers or lesions seen  PSYCHIATRIC: Patient shows appropriate affect, is alert and oriented x3, intact judgment and insight.      Assessment:  1. Pelvic pain  US-PELVIC COMPLETE (TRANSABDOMINAL/TRANSVAGINAL) (COMBO)       Plan:    Given findings and examination today, will order ultrasound to assess for presence and appearance of ovaries.    We will also obtain records from her prior surgeries as well.    If no evidence of any pathology is found on ultrasound, will discuss vaginal cultures with patient.    Final plan of care to follow results of ultrasound and review of prior records.  May need diagnostic laparoscopy (especially given history of endometriosis in the past) versus removal of ovaries.    All questions answered

## 2019-06-17 NOTE — TELEPHONE ENCOUNTER
----- Message from Ynes Beard sent at 6/15/2019  1:26 PM PDT -----  Regarding: Prescription Question  Contact: [phone]  When we refilled all my medications we were increasing the flexeril to twice a day but i only received 30 not 60. When i picked up my furmosimide i received 60 but i only need 30. What do i need to do to get this fixed?

## 2019-06-18 DIAGNOSIS — R10.13 DYSPEPSIA: ICD-10-CM

## 2019-06-18 DIAGNOSIS — R60.9 WATER RETENTION: ICD-10-CM

## 2019-06-18 DIAGNOSIS — K21.9 GASTROESOPHAGEAL REFLUX DISEASE WITHOUT ESOPHAGITIS: ICD-10-CM

## 2019-06-18 DIAGNOSIS — M62.838 MUSCLE SPASM OF LEFT SHOULDER AREA: ICD-10-CM

## 2019-06-18 RX ORDER — CYCLOBENZAPRINE HCL 10 MG
10 TABLET ORAL 2 TIMES DAILY PRN
Qty: 60 TAB | Refills: 5 | Status: SHIPPED | OUTPATIENT
Start: 2019-06-18 | End: 2021-06-03 | Stop reason: SDUPTHER

## 2019-06-18 RX ORDER — OMEPRAZOLE 20 MG/1
20 CAPSULE, DELAYED RELEASE ORAL DAILY
Qty: 30 CAP | Refills: 3 | Status: SHIPPED | OUTPATIENT
Start: 2019-06-18 | End: 2019-06-18

## 2019-06-18 RX ORDER — FUROSEMIDE 20 MG/1
20 TABLET ORAL DAILY
Qty: 30 TAB | Refills: 5 | Status: SHIPPED | OUTPATIENT
Start: 2019-06-18 | End: 2021-06-18

## 2019-06-24 ENCOUNTER — NON-PROVIDER VISIT (OUTPATIENT)
Dept: PULMONOLOGY | Facility: HOSPICE | Age: 44
End: 2019-06-24
Attending: NURSE PRACTITIONER
Payer: MEDICARE

## 2019-06-24 ENCOUNTER — OFFICE VISIT (OUTPATIENT)
Dept: PULMONOLOGY | Facility: HOSPICE | Age: 44
End: 2019-06-24
Payer: MEDICARE

## 2019-06-24 ENCOUNTER — TELEPHONE (OUTPATIENT)
Dept: CARDIOLOGY | Facility: MEDICAL CENTER | Age: 44
End: 2019-06-24

## 2019-06-24 ENCOUNTER — HOSPITAL ENCOUNTER (OUTPATIENT)
Dept: RADIOLOGY | Facility: MEDICAL CENTER | Age: 44
End: 2019-06-24

## 2019-06-24 VITALS
OXYGEN SATURATION: 100 % | BODY MASS INDEX: 29.71 KG/M2 | DIASTOLIC BLOOD PRESSURE: 74 MMHG | HEIGHT: 64 IN | RESPIRATION RATE: 18 BRPM | TEMPERATURE: 97.7 F | SYSTOLIC BLOOD PRESSURE: 112 MMHG | WEIGHT: 174 LBS | HEART RATE: 62 BPM

## 2019-06-24 DIAGNOSIS — J44.9 CHRONIC OBSTRUCTIVE PULMONARY DISEASE, UNSPECIFIED COPD TYPE (HCC): ICD-10-CM

## 2019-06-24 DIAGNOSIS — Z72.0 TOBACCO USE: ICD-10-CM

## 2019-06-24 DIAGNOSIS — J96.11 CHRONIC RESPIRATORY FAILURE WITH HYPOXIA (HCC): ICD-10-CM

## 2019-06-24 PROCEDURE — 99204 OFFICE O/P NEW MOD 45 MIN: CPT | Mod: 25 | Performed by: INTERNAL MEDICINE

## 2019-06-24 PROCEDURE — 94726 PLETHYSMOGRAPHY LUNG VOLUMES: CPT | Performed by: INTERNAL MEDICINE

## 2019-06-24 PROCEDURE — 94729 DIFFUSING CAPACITY: CPT | Performed by: INTERNAL MEDICINE

## 2019-06-24 PROCEDURE — 94060 EVALUATION OF WHEEZING: CPT | Performed by: INTERNAL MEDICINE

## 2019-06-24 PROCEDURE — 94761 N-INVAS EAR/PLS OXIMETRY MLT: CPT | Performed by: INTERNAL MEDICINE

## 2019-06-24 ASSESSMENT — ENCOUNTER SYMPTOMS
SORE THROAT: 0
ABDOMINAL PAIN: 0
DOUBLE VISION: 0
DIAPHORESIS: 0
DIZZINESS: 0
CONSTIPATION: 0
WHEEZING: 1
FALLS: 0
TREMORS: 0
CLAUDICATION: 0
WEAKNESS: 0
SHORTNESS OF BREATH: 1
EYE REDNESS: 0
WEIGHT LOSS: 0
BACK PAIN: 0
DEPRESSION: 0
PHOTOPHOBIA: 0
PALPITATIONS: 0
HEMOPTYSIS: 0
VOMITING: 0
NECK PAIN: 0
HEADACHES: 0
FEVER: 0
CHILLS: 0
SINUS PAIN: 0
NAUSEA: 0
BLURRED VISION: 0
EYE DISCHARGE: 0
SPUTUM PRODUCTION: 0
COUGH: 0
MYALGIAS: 0
SPEECH CHANGE: 0
ORTHOPNEA: 0
FOCAL WEAKNESS: 0
STRIDOR: 0
DIARRHEA: 0
HEARTBURN: 0
EYE PAIN: 0
PND: 0

## 2019-06-24 ASSESSMENT — PULMONARY FUNCTION TESTS
FEV1/FVC_PERCENT_LLN: 68
FEV1: 1.95
FEV1/FVC_PERCENT_PREDICTED: 78
FEV1_PERCENT_PREDICTED: 59
FEV1/FVC_PERCENT_PREDICTED: 80
FEV1/FVC_PERCENT_PREDICTED: 82
FEV1/FVC_PERCENT_CHANGE: -4
FEV1_PERCENT_CHANGE: 13
FEV1_PERCENT_PREDICTED: 67
FEV1/FVC_PREDICTED: 81
FEV1/FVC: 66
FEV1_LLN: 2.41
FVC: 2.59
FEV1/FVC: 63
FVC: 3.09
FEV1_PERCENT_CHANGE: 19
FVC_PERCENT_PREDICTED: 85
FVC_LLN: 3.01
FEV1/FVC: 67
FEV1/FVC: 63.11
FEV1/FVC_PERCENT_PREDICTED: 83
FVC_PERCENT_PREDICTED: 71
FEV1_PREDICTED: 2.89
FEV1/FVC_PERCENT_CHANGE: 68
FEV1/FVC_PERCENT_PREDICTED: 79
FVC_PREDICTED: 3.6
FEV1: 1.72

## 2019-06-24 NOTE — PROCEDURES
Multi-Ox Readings  Multi Ox #1 Room air   O2 sat % at rest 97   O2 sat % on exertion 90   O2 sat average on exertion 93   Multi Ox #2     O2 sat % at rest     O2 sat % on exertion     O2 sat average on exertion       Oxygen Use 2   Oxygen Frequency 24/7   Duration of need     Is the patient mobile within the home?     CPAP Use?     BIPAP Use?     Servo Titration

## 2019-06-24 NOTE — PROCEDURES
Good patient effort & cooperation.  The results of this test meet the ATS/ERS standards for acceptability and repeatability.  Predicted equations for Spirometry are N-Jorge Alberto II, ITS for lung volumes, and [facility] for DLCO.  The DLCO was uncorrected for Hgb.  A bronchodilator of Ventolin HFA- 2puffs via spacer were administered.  DLCO was performed during dilation period.    MD Interpretation:  1.  Baseline spirometry shows mild to moderate airflow obstruction with FEV1/FVC of 67, FEV1 of 1.72 L or 59% predicted.  2.  There is significant bronchodilator response with improvement in FEV1 by 13%.  3.  Lung volumes are within normal limits with a total lung capacity of 112% of predicted.  RV and RV/TLC are elevated suggestive of air trapping.  4.  DLCO is low normal at 84% of predicted.  Overall pulmonary function tests are suggestive of mild to moderate COPD with positive bronchodilator response.

## 2019-06-24 NOTE — PROGRESS NOTES
Chief Complaint   Patient presents with   • Establish Care     referral LOUIE Burnett previous pma 14   • Results     PFt 60 19        HPI: This patient is a 44 y.o. female presenting for evaluation of COPD.  The patient's past medical history is significant for a diagnosis of COPD, anxiety, dyslipidemia, bipolar disorder/major depressive disorder, breast cancer status post bilateral mastectomy not requiring chemo or radiation therapy.  The patient has a 33-pack-year tobacco history and continues to smoke but has decreased this to 4 cigarettes/day from previously up to greater than 1 pack/day.  She drinks a glass of wine on average monthly, no history of illicit drug use.  She did smoke pot occasionally in her young adulthood but denies currently.  She has no known occupational exposures.  She does have a sister who  of lung cancer at the age of 46.  [facility]  and noted to be on supplemental oxygen at 2 L/min 24 hours a day at that time.  Since being seen there she was followed at [facility] and pulmonary clinic but chose to return to our clinic.  She is currently on methylprednisolone at 4 mg daily by her primary care provider for presumed COPD exacerbation.  She was previously on Advair 500/50 in  and at some point was changed to Breo.  She was recently changed back to Advair by her primary care provider but is not on LAMA.  She uses her rescue bronchodilator up to 3 times daily including occasionally at night.  She does have mild chronic cough that is not productive.  She also endorses wheezing when short of breath.  She denies chest pain exertional or otherwise.  No lower extremity edema, no paroxysmal nocturnal dyspnea or orthopnea.  She does have an echocardiogram from 2018 done at [facility] which was not suggestive of pulmonary hypertension, normal left ventricular ejection fraction.  Pulmonary function test today  show mild to moderate COPD with preserved DLCO and mild air trapping but normal total lung capacity.  The patient tells me that she had a radiographic finding showing pulmonary nodules recently at [facility].  Upon request of records we received a CT abdomen and pelvis that reports only visualized lung bases as being unremarkable but no mention of nodules.    Past Medical History:   Diagnosis Date   • Anemia    • Anxiety    • Arthritis    • ASTHMA    • Bipolar 1 disorder ([facility]) 2002   • Bronchitis    • Cancer ([facility]) 2001    br CA   • COPD (chronic obstructive pulmonary disease) ([facility]) 2009   • Fibrocystic breast    • Fibrocystic breast disease 11/8/2011   • Hemorrhoids    • Hx of cervical malignancy 11/8/2011   • Hyperlipidemia    • Migraine    • Pneumonia        Social History     Social History   • Marital status:      Spouse name: N/A   • Number of children: N/A   • Years of education: N/A     Occupational History   •       Social History Main Topics   • Smoking status: Current Every Day Smoker     Packs/day: 0.50     Years: 33.00     Types: Cigarettes     Last attempt to quit: 2/8/2011   • Smokeless tobacco: Never Used      Comment: 5 cigs a day    • Alcohol use No   • Drug use: No   • Sexual activity: Yes     Partners: Male     Other Topics Concern   • Not on file     Social History Narrative   • No narrative on file       Family History   Problem Relation Age of Onset   • Lung Disease Mother    • Cancer Mother         cervical   • Heart Disease Mother    • Hypertension Mother    • Other Mother         migraines   • Lung Disease Father    • Asthma Father    • Psychiatry Father    • Cancer Sister         lung   • Cancer Maternal Grandmother 70        throat   • Hypertension Unknown    • Cancer Unknown    • Lung Disease Unknown    • Heart Disease Maternal Uncle    • Lung Disease Maternal Uncle    • Anemia Daughter        Current Outpatient Prescriptions on File Prior to Visit   Medication Sig  Dispense Refill   • cyclobenzaprine (FLEXERIL) 10 MG Tab Take 1 Tab by mouth 2 times a day as needed. 60 Tab 5   • furosemide (LASIX) 20 MG Tab Take 1 Tab by mouth every day. 30 Tab 5   • NEXIUM 40 MG delayed-release capsule Take 1 Cap by mouth every morning before breakfast. 90 Cap 1   • vitamin D (CHOLECALCIFEROL) 1000 UNIT Tab Take 2,000 Units by mouth every day.     • hydrOXYzine HCl (ATARAX) 25 MG Tab Take 1 Tab by mouth 2 Times a Day. 30 Tab 2   • methylPREDNISolone (MEDROL) 4 MG Tab Take 1 Tab by mouth every day. 30 Tab 0   • simvastatin (ZOCOR) 40 MG Tab Take 1 Tab by mouth every evening. 30 Tab 3   • montelukast (SINGULAIR) 10 MG Tab Take 1 Tab by mouth every day. 30 Tab 5   • fluticasone-salmeterol (ADVAIR) 500-50 MCG/DOSE AEROSOL POWDER, BREATH ACTIVATED Inhale 1 Puff by mouth every 12 hours. 1 Inhaler 4   • propranolol (INDERAL) 20 MG Tab Take 1 Tab by mouth every day. 30 Tab 1   • FLUoxetine HCl 60 MG Tab Take 60 Tabs by mouth every day. 30 Tab 3   • albuterol 108 (90 Base) MCG/ACT Aero Soln inhalation aerosol Inhale 2 Puffs by mouth every 6 hours as needed for Shortness of Breath. 1 Inhaler 0   • BREO ELLIPTA 200-25 MCG/INH AEROSOL POWDER, BREATH ACTIVATED   6     No current facility-administered medications on file prior to visit.        Allergies: Nkda [no known drug allergy]    ROS:   Review of Systems   Constitutional: Negative for chills, diaphoresis, fever, malaise/fatigue and weight loss.   HENT: Negative for congestion, ear discharge, ear pain, hearing loss, nosebleeds, sinus pain, sore throat and tinnitus.    Eyes: Negative for blurred vision, double vision, photophobia, pain, discharge and redness.   Respiratory: Positive for shortness of breath and wheezing. Negative for cough, hemoptysis, sputum production and stridor.    Cardiovascular: Negative for chest pain, palpitations, orthopnea, claudication, leg swelling and PND.   Gastrointestinal: Negative for abdominal pain, constipation,  "diarrhea, heartburn, nausea and vomiting.   Genitourinary: Negative for dysuria and urgency.   Musculoskeletal: Negative for back pain, falls, joint pain, myalgias and neck pain.   Skin: Negative for itching and rash.   Neurological: Negative for dizziness, tremors, speech change, focal weakness, weakness and headaches.   Endo/Heme/Allergies: Negative for environmental allergies.   Psychiatric/Behavioral: Negative for depression.       /74 (BP Location: Right arm, Patient Position: Sitting, BP Cuff Size: Adult)   Pulse 62   Temp 36.5 °C (97.7 °F) (Temporal)   Resp 18   Ht 1.626 m (5' 4\")   Wt 78.9 kg (174 lb)   SpO2 100%     Physical Exam:  Physical Exam   Constitutional: She is oriented to person, place, and time. She appears well-developed and well-nourished.   HENT:   Head: Normocephalic and atraumatic.   Left Ear: External ear normal.   Mouth/Throat: Oropharynx is clear and moist. No oropharyngeal exudate.   Eyes: Pupils are equal, round, and reactive to light. Conjunctivae and EOM are normal. No scleral icterus.   Neck: Neck supple. No JVD present. No tracheal deviation present.   Cardiovascular: Normal rate, regular rhythm and normal heart sounds.  Exam reveals no gallop and no friction rub.    No murmur heard.  Pulmonary/Chest: Effort normal. No accessory muscle usage. No respiratory distress. She has wheezes. She has no rales.   Few scattered expiratory wheezes   Abdominal: Soft. She exhibits no distension. There is no tenderness.   Musculoskeletal: Normal range of motion. She exhibits no edema, tenderness or deformity.   Lymphadenopathy:     She has no cervical adenopathy.   Neurological: She is alert and oriented to person, place, and time. No cranial nerve deficit. Gait normal.   Skin: Skin is warm and dry. No rash noted. No cyanosis. Nails show no clubbing.   Psychiatric: She has a normal mood and affect.       PFTs as reviewed by me personally: as per HPI    Imaging as reviewed by me " personally: as per HPI    Assessment:  1. Chronic respiratory failure with hypoxia (HCC)  Multiple Oximetry    Overnight Oximetry   2. Chronic obstructive pulmonary disease, unspecified COPD type (HCC)  Umeclidinium Bromide (INCRUSE ELLIPTA) 62.5 MCG/INH AEROSOL POWDER, BREATH ACTIVATED   3. Tobacco use         Plan:  1.  Patient did not desaturate on multi-ox in clinic today.  I suspect that her diagnosis of acute or chronic respiratory failure was in the setting of COPD exacerbation in the past.  Her DLCO is preserved.  We will assess her oxygenation at night with overnight oximetry, but at present it does not appear she needs oxygen with activity.  2.  Patient has mild to moderate disease with preserved DLCO and mild air trapping.  Given ongoing symptoms with frequent rescue bronchodilator use, we will add LAMA with in [place] and continue Advair and as needed short acting bronchodilators.  Patient was counseled on tobacco cessation.  3.  Patient was counseled on tobacco cessation and she is currently actively working to reduce tobacco use.  I encouraged her ongoing efforts and we will continue this discussion.  She will need annual CT chest low-dose radiation but does not qualify for this strictly until the age of 53.  We will obtain any additional records that we can from [facility] regarding reported pulmonary nodules however currently CT abdomen and pelvis from 2018 was not suggestive of this.  Return in about 8 weeks (around 8/19/2019) for copd .

## 2019-06-24 NOTE — TELEPHONE ENCOUNTER
Called patient to discuss prior Cardio, patient states not being seen before. Did have an Angioplasty done at [facility]. Records Requested.

## 2019-06-27 ENCOUNTER — HOSPITAL ENCOUNTER (OUTPATIENT)
Dept: LAB | Facility: MEDICAL CENTER | Age: 44
End: 2019-06-27
Attending: NURSE PRACTITIONER
Payer: MEDICARE

## 2019-06-27 DIAGNOSIS — Z83.3 FAMILY HISTORY OF DIABETES MELLITUS: ICD-10-CM

## 2019-06-27 DIAGNOSIS — E55.9 VITAMIN D DEFICIENCY: ICD-10-CM

## 2019-06-27 DIAGNOSIS — E78.5 HYPERLIPIDEMIA, UNSPECIFIED HYPERLIPIDEMIA TYPE: ICD-10-CM

## 2019-06-27 LAB
25(OH)D3 SERPL-MCNC: 17 NG/ML (ref 30–100)
ALBUMIN SERPL BCP-MCNC: 3.9 G/DL (ref 3.2–4.9)
ALBUMIN/GLOB SERPL: 1.3 G/DL
ALP SERPL-CCNC: 57 U/L (ref 30–99)
ALT SERPL-CCNC: 14 U/L (ref 2–50)
ANION GAP SERPL CALC-SCNC: 11 MMOL/L (ref 0–11.9)
AST SERPL-CCNC: 16 U/L (ref 12–45)
BASOPHILS # BLD AUTO: 0.4 % (ref 0–1.8)
BASOPHILS # BLD: 0.04 K/UL (ref 0–0.12)
BILIRUB SERPL-MCNC: 0.4 MG/DL (ref 0.1–1.5)
BUN SERPL-MCNC: 17 MG/DL (ref 8–22)
CALCIUM SERPL-MCNC: 8.9 MG/DL (ref 8.5–10.5)
CHLORIDE SERPL-SCNC: 106 MMOL/L (ref 96–112)
CHOLEST SERPL-MCNC: 232 MG/DL (ref 100–199)
CO2 SERPL-SCNC: 26 MMOL/L (ref 20–33)
CREAT SERPL-MCNC: 0.87 MG/DL (ref 0.5–1.4)
EOSINOPHIL # BLD AUTO: 0.13 K/UL (ref 0–0.51)
EOSINOPHIL NFR BLD: 1.5 % (ref 0–6.9)
ERYTHROCYTE [DISTWIDTH] IN BLOOD BY AUTOMATED COUNT: 46.2 FL (ref 35.9–50)
EST. AVERAGE GLUCOSE BLD GHB EST-MCNC: 123 MG/DL
FASTING STATUS PATIENT QL REPORTED: NORMAL
GLOBULIN SER CALC-MCNC: 3 G/DL (ref 1.9–3.5)
GLUCOSE SERPL-MCNC: 94 MG/DL (ref 65–99)
HBA1C MFR BLD: 5.9 % (ref 0–5.6)
HCT VFR BLD AUTO: 44.1 % (ref 37–47)
HDLC SERPL-MCNC: 37 MG/DL
HGB BLD-MCNC: 13.8 G/DL (ref 12–16)
IMM GRANULOCYTES # BLD AUTO: 0.02 K/UL (ref 0–0.11)
IMM GRANULOCYTES NFR BLD AUTO: 0.2 % (ref 0–0.9)
LDLC SERPL CALC-MCNC: 179 MG/DL
LYMPHOCYTES # BLD AUTO: 3.12 K/UL (ref 1–4.8)
LYMPHOCYTES NFR BLD: 35.1 % (ref 22–41)
MCH RBC QN AUTO: 27.3 PG (ref 27–33)
MCHC RBC AUTO-ENTMCNC: 31.3 G/DL (ref 33.6–35)
MCV RBC AUTO: 87.2 FL (ref 81.4–97.8)
MONOCYTES # BLD AUTO: 0.53 K/UL (ref 0–0.85)
MONOCYTES NFR BLD AUTO: 6 % (ref 0–13.4)
NEUTROPHILS # BLD AUTO: 5.05 K/UL (ref 2–7.15)
NEUTROPHILS NFR BLD: 56.8 % (ref 44–72)
NRBC # BLD AUTO: 0 K/UL
NRBC BLD-RTO: 0 /100 WBC
PLATELET # BLD AUTO: 261 K/UL (ref 164–446)
PMV BLD AUTO: 9.7 FL (ref 9–12.9)
POTASSIUM SERPL-SCNC: 3.7 MMOL/L (ref 3.6–5.5)
PROT SERPL-MCNC: 6.9 G/DL (ref 6–8.2)
RBC # BLD AUTO: 5.06 M/UL (ref 4.2–5.4)
SODIUM SERPL-SCNC: 143 MMOL/L (ref 135–145)
T4 FREE SERPL-MCNC: 0.68 NG/DL (ref 0.53–1.43)
TRIGL SERPL-MCNC: 81 MG/DL (ref 0–149)
TSH SERPL DL<=0.005 MIU/L-ACNC: 0.94 UIU/ML (ref 0.38–5.33)
WBC # BLD AUTO: 8.9 K/UL (ref 4.8–10.8)

## 2019-06-27 PROCEDURE — 36415 COLL VENOUS BLD VENIPUNCTURE: CPT

## 2019-06-27 PROCEDURE — 83036 HEMOGLOBIN GLYCOSYLATED A1C: CPT | Mod: GA

## 2019-06-27 PROCEDURE — 80061 LIPID PANEL: CPT

## 2019-06-27 PROCEDURE — 85025 COMPLETE CBC W/AUTO DIFF WBC: CPT

## 2019-06-27 PROCEDURE — 82306 VITAMIN D 25 HYDROXY: CPT

## 2019-06-27 PROCEDURE — 80053 COMPREHEN METABOLIC PANEL: CPT

## 2019-06-27 PROCEDURE — 84439 ASSAY OF FREE THYROXINE: CPT

## 2019-06-27 PROCEDURE — 84443 ASSAY THYROID STIM HORMONE: CPT

## 2019-07-01 ENCOUNTER — HOSPITAL ENCOUNTER (OUTPATIENT)
Dept: RADIOLOGY | Facility: MEDICAL CENTER | Age: 44
End: 2019-07-01
Attending: NURSE PRACTITIONER
Payer: MEDICARE

## 2019-07-01 ENCOUNTER — TELEPHONE (OUTPATIENT)
Dept: MEDICAL GROUP | Facility: PHYSICIAN GROUP | Age: 44
End: 2019-07-01

## 2019-07-01 ENCOUNTER — APPOINTMENT (OUTPATIENT)
Dept: CARDIOLOGY | Facility: MEDICAL CENTER | Age: 44
End: 2019-07-01
Attending: NURSE PRACTITIONER
Payer: MEDICARE

## 2019-07-01 NOTE — TELEPHONE ENCOUNTER
MEDICATION PRIOR AUTHORIZATION NEEDED:    1. Name of Medication: Nexium    2. Requested By (Name of Pharmacy): [facility]     3. Is insurance on file current? Y    4. What is the name & phone number of the 3rd party payor? N/A

## 2019-07-02 NOTE — TELEPHONE ENCOUNTER
FINAL PRIOR AUTHORIZATION STATUS:    1.  Name of Medication & Dose: Nexium 40      2. Prior Auth Status: Approved through 06/2020     3. Action Taken: Pharmacy Notified: no Patient Notified: yes

## 2019-07-03 ENCOUNTER — OFFICE VISIT (OUTPATIENT)
Dept: MEDICAL GROUP | Facility: PHYSICIAN GROUP | Age: 44
End: 2019-07-03
Payer: MEDICARE

## 2019-07-03 VITALS
HEIGHT: 64 IN | OXYGEN SATURATION: 98 % | BODY MASS INDEX: 30.6 KG/M2 | WEIGHT: 179.23 LBS | SYSTOLIC BLOOD PRESSURE: 114 MMHG | HEART RATE: 80 BPM | TEMPERATURE: 98.1 F | RESPIRATION RATE: 16 BRPM | DIASTOLIC BLOOD PRESSURE: 70 MMHG

## 2019-07-03 DIAGNOSIS — E55.9 VITAMIN D DEFICIENCY: ICD-10-CM

## 2019-07-03 DIAGNOSIS — R73.03 PREDIABETES: ICD-10-CM

## 2019-07-03 DIAGNOSIS — E78.2 MIXED HYPERLIPIDEMIA: ICD-10-CM

## 2019-07-03 PROCEDURE — 99214 OFFICE O/P EST MOD 30 MIN: CPT | Performed by: NURSE PRACTITIONER

## 2019-07-03 RX ORDER — ERGOCALCIFEROL 1.25 MG/1
50000 CAPSULE ORAL
Qty: 12 CAP | Refills: 0 | Status: SHIPPED | OUTPATIENT
Start: 2019-07-03 | End: 2021-06-03

## 2019-07-03 RX ORDER — TRAZODONE HYDROCHLORIDE 100 MG/1
100 TABLET ORAL NIGHTLY
COMMUNITY
End: 2021-06-03

## 2019-07-03 NOTE — ASSESSMENT & PLAN NOTE
Reviewed labs from 6/27/2019, noted vitamin D at 17, normal ranges .  Patient reports taking vitamin D supplement of 1000 mg daily.

## 2019-07-03 NOTE — LETTER
July 3, 2019         Patient: Ynes Beard   YOB: 1975   Date of Visit: 7/3/2019           To Whom it May Concern:    Ynes Beard was seen in my clinic on 7/3/2019. She may return to work on 07/04/2019.  Please excuse any absences from work.    If you have any questions or concerns, please don't hesitate to call.        Sincerely,           DOMINIQUE Desouza.  Electronically Signed

## 2019-07-03 NOTE — ASSESSMENT & PLAN NOTE
Reviewed labs, noted hemoglobin A1c at 5.9.  Patient reports diet high in refined carbohydrates and overall poor diet, in addition to minimal daily exercise.  Patient denies polyphagia, polyuria polydipsia.  No changes in vision, paresthesia pain in lower extremities.   Ref. Range 6/27/2019 15:54   Glycohemoglobin Latest Ref Range: 0.0 - 5.6 % 5.9 (H)

## 2019-07-03 NOTE — ASSESSMENT & PLAN NOTE
Lab results reviewed.  Patient reports diet is high in refined carbohydrates, as well as sedentary lifestyle.  Patient is taking simvastatin 40 mg daily.  Denies side effects.   Ref. Range 6/24/2019 11:14 6/24/2019 11:15 6/27/2019 15:54   Cholesterol,Tot Latest Ref Range: 100 - 199 mg/dL   232 (H)   Triglycerides Latest Ref Range: 0 - 149 mg/dL   81   HDL Latest Ref Range: >=40 mg/dL   37 (A)   LDL Latest Ref Range: <100 mg/dL   179 (H)   The 10-year ASCVD risk score ([place] BRAY Jr., et al., 2013) is: 5.3%    Values used to calculate the score:      Age: 44 years      Sex: Female      Is Non- : No      Diabetic: No      Tobacco smoker: Yes      Systolic Blood Pressure: 114 mmHg      Is BP treated: No      HDL Cholesterol: 37 mg/dL      Total Cholesterol: 232 mg/dL

## 2019-07-03 NOTE — PROGRESS NOTES
Chief Complaint   Patient presents with   • Results     lab       HISTORY OF PRESENT ILLNESS: Patient is a 44 y.o. female, established patient who presents today to discuss medical problems as listed below:    Hyperlipidemia  Lab results reviewed.  Patient reports diet is high in refined carbohydrates, as well as sedentary lifestyle.  Patient is taking simvastatin 40 mg daily.  Denies side effects.   Ref. Range 6/24/2019 11:14 6/24/2019 11:15 6/27/2019 15:54   Cholesterol,Tot Latest Ref Range: 100 - 199 mg/dL   232 (H)   Triglycerides Latest Ref Range: 0 - 149 mg/dL   81   HDL Latest Ref Range: >=40 mg/dL   37 (A)   LDL Latest Ref Range: <100 mg/dL   179 (H)   The 10-year ASCVD risk score (Franky BRAY Jr., et al., 2013) is: 5.3%    Values used to calculate the score:      Age: 44 years      Sex: Female      Is Non- : No      Diabetic: No      Tobacco smoker: Yes      Systolic Blood Pressure: 114 mmHg      Is BP treated: No      HDL Cholesterol: 37 mg/dL      Total Cholesterol: 232 mg/dL    Vitamin D deficiency  Reviewed labs from 6/27/2019, noted vitamin D at 17, normal ranges .  Patient reports taking vitamin D supplement of 1000 mg daily.    Prediabetes  Reviewed labs, noted hemoglobin A1c at 5.9.  Patient reports diet high in refined carbohydrates and overall poor diet, in addition to minimal daily exercise.  Patient denies polyphagia, polyuria polydipsia.  No changes in vision, paresthesia pain in lower extremities.   Ref. Range 6/27/2019 15:54   Glycohemoglobin Latest Ref Range: 0.0 - 5.6 % 5.9 (H)       Patient Active Problem List    Diagnosis Date Noted   • Prediabetes 07/03/2019   • Pelvic pain 06/17/2019   • Encounter to establish care 06/11/2019   • Family history of diabetes mellitus 06/11/2019   • Vitamin D deficiency 06/11/2019   • Hyperlipidemia 06/11/2019   • History of endometriosis 06/11/2019   • Anxiety 06/11/2019   • Muscle spasm of left shoulder area 06/11/2019   • Water  retention 06/11/2019   • GERD (gastroesophageal reflux disease) 06/11/2019   • Depression 06/11/2019   • SOB (shortness of breath) 06/11/2019   • Left breast lump 06/11/2019   • Bipolar disorder ([facility]) 01/07/2013   • COPD (chronic obstructive pulmonary disease) ([facility]) 11/08/2011   • Fibrocystic breast disease 11/08/2011   • Hx of cervical malignancy 11/08/2011   • Dyspepsia 11/08/2011        Allergies: Nkda [no known drug allergy]    Current Outpatient Prescriptions   Medication Sig Dispense Refill   • traZODone (DESYREL) 100 MG Tab Take 100 mg by mouth every evening.     • vitamin D, Ergocalciferol, (DRISDOL) 85493 units Cap capsule Take 1 Cap by mouth every 7 days. 12 Cap 0   • Umeclidinium Bromide (INCRUSE ELLIPTA) 62.5 MCG/INH AEROSOL POWDER, BREATH ACTIVATED Inhale 1 Puff by mouth every day. 1 Each 11   • cyclobenzaprine (FLEXERIL) 10 MG Tab Take 1 Tab by mouth 2 times a day as needed. 60 Tab 5   • furosemide (LASIX) 20 MG Tab Take 1 Tab by mouth every day. 30 Tab 5   • NEXIUM 40 MG delayed-release capsule Take 1 Cap by mouth every morning before breakfast. 90 Cap 1   • vitamin D (CHOLECALCIFEROL) 1000 UNIT Tab Take 2,000 Units by mouth every day.     • hydrOXYzine HCl (ATARAX) 25 MG Tab Take 1 Tab by mouth 2 Times a Day. 30 Tab 2   • simvastatin (ZOCOR) 40 MG Tab Take 1 Tab by mouth every evening. 30 Tab 3   • montelukast (SINGULAIR) 10 MG Tab Take 1 Tab by mouth every day. 30 Tab 5   • albuterol 108 (90 Base) MCG/ACT Aero Soln inhalation aerosol Inhale 2 Puffs by mouth every 6 hours as needed for Shortness of Breath. 1 Inhaler 0   • fluticasone-salmeterol (ADVAIR) 500-50 MCG/DOSE AEROSOL POWDER, BREATH ACTIVATED Inhale 1 Puff by mouth every 12 hours. 1 Inhaler 4   • propranolol (INDERAL) 20 MG Tab Take 1 Tab by mouth every day. 30 Tab 1   • FLUoxetine HCl 60 MG Tab Take 60 Tabs by mouth every day. 30 Tab 3   • methylPREDNISolone (MEDROL) 4 MG Tab Take 1 Tab by mouth every day. (Patient not taking: Reported on  "7/3/2019) 30 Tab 0   • BREO ELLIPTA 200-25 MCG/INH AEROSOL POWDER, BREATH ACTIVATED   6     No current facility-administered medications for this visit.        Social History   Substance Use Topics   • Smoking status: Current Every Day Smoker     Packs/day: 0.50     Years: 33.00     Types: Cigarettes     Last attempt to quit: 2/8/2011   • Smokeless tobacco: Never Used      Comment: 5 cigs a day    • Alcohol use No     Social History     Social History Narrative   • No narrative on file       Family History   Problem Relation Age of Onset   • Lung Disease Mother    • Cancer Mother         cervical   • Heart Disease Mother    • Hypertension Mother    • Other Mother         migraines   • Lung Disease Father    • Asthma Father    • Psychiatry Father    • Cancer Sister         lung   • Cancer Maternal Grandmother 70        throat   • Hypertension Unknown    • Cancer Unknown    • Lung Disease Unknown    • Heart Disease Maternal Uncle    • Lung Disease Maternal Uncle    • Anemia Daughter        Allergies, past medical history, past surgical history, family history, social history reviewed and updated.    Review of Systems:     - Constitutional: Negative for fever, chills, unexpected weight change, and fatigue/generalized weakness.     - Respiratory: Negative for cough, sputum production, chest congestion, dyspnea, wheezing, and crackles.      - Cardiovascular: Negative for chest pain, palpitations, orthopnea, and bilateral lower extremity edema.     - Psychiatric/Behavioral: Negative for depression, suicidal/homicidal ideation and memory loss.      All other systems reviewed and are negative    Exam:    /70 (BP Location: Right arm, Patient Position: Sitting, BP Cuff Size: Adult)   Pulse 80   Temp 36.7 °C (98.1 °F)   Resp 16   Ht 1.626 m (5' 4\")   Wt 81.3 kg (179 lb 3.7 oz)   LMP  (LMP Unknown)   SpO2 98%   BMI 30.77 kg/m²   Body mass index is 30.77 kg/m².    Physical Exam:  Constitutional: Well-developed and " well-nourished. Not diaphoretic. No distress.   Cardiovascular: Regular rate and rhythm, S1 and S2 without murmur, rubs, or gallops.    Chest: Effort normal. Clear to auscultation throughout. No adventitious sounds.   Psychiatric:  Behavior, mood, and affect are appropriate.    LABS: 6/27/19  results reviewed and discussed with the patient, questions answered.    Patient was seen for 25 minutes face to face of which, 20 minutes was spent counseling regarding the healthy lifestyle.    Assessment/Plan:  1. Vitamin D deficiency  Uncontrolled, stable.  Prescription written and sent to the pharmacy.  After completing Rx, take OTC vitamin D3 5000 milligrams daily.  - vitamin D, Ergocalciferol, (DRISDOL) 46641 units Cap capsule; Take 1 Cap by mouth every 7 days.  Dispense: 12 Cap; Refill: 0    2. Mixed hyperlipidemia  Uncontrolled, stable.  Continue taking current medications.  Patient educated on healthy lifestyle, avoid refined carbohydrates and fried foods.  Increase consumption of fiber (vegetables, lentils, quinoa, oatmeal, supplemental fiber) and healthy fats (consulted and unsweetened nuts, avocado, all of oil, fish).    3. Prediabetes  Uncontrolled, stable. Patient educated on healthy lifestyle, including exercise and diet. Avoid refined carbohydrates and fried foods.  Increase consumption of fiber (vegetables, lentils, quinoa, oatmeal, supplemental fiber) and healthy fats (consulted and unsweetened nuts, avocado, all of oil, fish).       Discussed with patient possible alternative diagnoses, pt is to take all medications as prescribed. If symptoms persist FU w/PCP, if symptoms worsen go to emergency room.  Reviewed indication, dosage, usage and potential adverse effects of prescribed medications. Reviewed risks and benefits of treatment plan. Patient verbalizes understanding of all instruction and verbally agrees to plan.    Return if symptoms worsen or fail to improve.

## 2019-07-05 DIAGNOSIS — R73.03 PREDIABETES: ICD-10-CM

## 2019-07-08 ENCOUNTER — HOSPITAL ENCOUNTER (OUTPATIENT)
Dept: RADIOLOGY | Facility: MEDICAL CENTER | Age: 44
End: 2019-07-08
Attending: OBSTETRICS & GYNECOLOGY
Payer: MEDICARE

## 2019-07-08 DIAGNOSIS — R10.2 PELVIC PAIN: ICD-10-CM

## 2019-07-08 PROCEDURE — 76830 TRANSVAGINAL US NON-OB: CPT

## 2019-07-09 DIAGNOSIS — R73.03 PREDIABETES: ICD-10-CM

## 2019-07-09 DIAGNOSIS — R73.09 ELEVATED HEMOGLOBIN A1C: ICD-10-CM

## 2019-07-09 RX ORDER — LANCETS 30 GAUGE
EACH MISCELLANEOUS
Qty: 100 EACH | Refills: 0 | Status: SHIPPED | OUTPATIENT
Start: 2019-07-09 | End: 2019-08-24 | Stop reason: SDUPTHER

## 2019-07-09 RX ORDER — GLUCOSAMINE HCL/CHONDROITIN SU 500-400 MG
CAPSULE ORAL
Qty: 100 EACH | Refills: 0 | Status: SHIPPED | OUTPATIENT
Start: 2019-07-09 | End: 2019-08-24 | Stop reason: SDUPTHER

## 2019-07-11 ENCOUNTER — HOME STUDY (OUTPATIENT)
Dept: PULMONOLOGY | Facility: HOSPICE | Age: 44
End: 2019-07-11
Attending: INTERNAL MEDICINE
Payer: MEDICARE

## 2019-07-11 DIAGNOSIS — J96.11 CHRONIC RESPIRATORY FAILURE WITH HYPOXIA (HCC): ICD-10-CM

## 2019-07-11 PROCEDURE — 94762 N-INVAS EAR/PLS OXIMTRY CONT: CPT | Performed by: INTERNAL MEDICINE

## 2019-07-17 NOTE — PROCEDURES
The patient was studied with continuous pulse oximetry overnight while using supplemental oxygen at 2 L/min.  No evidence of any significant oxygen desaturation was noted.

## 2019-07-18 ENCOUNTER — OFFICE VISIT (OUTPATIENT)
Dept: MEDICAL GROUP | Facility: PHYSICIAN GROUP | Age: 44
End: 2019-07-18
Payer: MEDICARE

## 2019-07-18 VITALS
OXYGEN SATURATION: 96 % | HEIGHT: 65 IN | BODY MASS INDEX: 30.85 KG/M2 | DIASTOLIC BLOOD PRESSURE: 80 MMHG | RESPIRATION RATE: 20 BRPM | WEIGHT: 185.19 LBS | TEMPERATURE: 97.6 F | SYSTOLIC BLOOD PRESSURE: 106 MMHG | HEART RATE: 84 BPM

## 2019-07-18 DIAGNOSIS — Z71.6 ENCOUNTER FOR SMOKING CESSATION COUNSELING: ICD-10-CM

## 2019-07-18 DIAGNOSIS — R73.03 PREDIABETES: ICD-10-CM

## 2019-07-18 PROCEDURE — 99214 OFFICE O/P EST MOD 30 MIN: CPT | Performed by: NURSE PRACTITIONER

## 2019-07-18 RX ORDER — BLOOD-GLUCOSE METER
KIT MISCELLANEOUS
Refills: 0 | COMMUNITY
Start: 2019-07-10

## 2019-07-18 RX ORDER — NICOTINE 21 MG/24HR
1 PATCH, TRANSDERMAL 24 HOURS TRANSDERMAL EVERY 24 HOURS
Qty: 14 PATCH | Refills: 0 | Status: SHIPPED | OUTPATIENT
Start: 2019-07-18 | End: 2021-06-03

## 2019-07-18 NOTE — ASSESSMENT & PLAN NOTE
This is a known issue since last visit.  Patient reports improving her nutrition, increased intake of fiber, prefers vegetables healthy fats and protein.  She is monitoring her BG's twice daily.  Reviewed BG log for the last week, noted BG ranging from .  Patient reports feeling much better, denies headaches, dizziness, weakness, SOB, CP.

## 2019-07-18 NOTE — ASSESSMENT & PLAN NOTE
Patient would like to quit smoking and interested in prescriptions for nicotine patches today.  She states in the past she successfully quit smoking for short period of time with nicotine patches taking 14 mg daily patch for 2 weeks followed by daily 7 mg patch for 2 weeks.  Patient reports efforts to eat healthy, including vegetables healthy fats and protein, as well as has adequate daily hydration.  Patient would like to improve her daily habits and exercise.

## 2019-07-18 NOTE — PROGRESS NOTES
Chief Complaint   Patient presents with   • Follow-Up     dm       HISTORY OF PRESENT ILLNESS: Patient is a 44 y.o. female, established patient who presents today to discuss medical problems as listed below:    Encounter for smoking cessation counseling  Patient would like to quit smoking and interested in prescriptions for nicotine patches today.  She states in the past she successfully quit smoking for short period of time with nicotine patches taking 14 mg daily patch for 2 weeks followed by daily 7 mg patch for 2 weeks.  Patient reports efforts to eat healthy, including vegetables healthy fats and protein, as well as has adequate daily hydration.  Patient would like to improve her daily habits and exercise.    Prediabetes  This is a known issue since last visit.  Patient reports improving her nutrition, increased intake of fiber, prefers vegetables healthy fats and protein.  She is monitoring her BG's twice daily.  Reviewed BG log for the last week, noted BG ranging from .  Patient reports feeling much better, denies headaches, dizziness, weakness, SOB, CP.      Patient Active Problem List    Diagnosis Date Noted   • Encounter for smoking cessation counseling 07/18/2019   • Elevated hemoglobin A1c 07/09/2019   • Prediabetes 07/03/2019   • Pelvic pain 06/17/2019   • Encounter to establish care 06/11/2019   • Family history of diabetes mellitus 06/11/2019   • Vitamin D deficiency 06/11/2019   • Hyperlipidemia 06/11/2019   • History of endometriosis 06/11/2019   • Anxiety 06/11/2019   • Muscle spasm of left shoulder area 06/11/2019   • Water retention 06/11/2019   • GERD (gastroesophageal reflux disease) 06/11/2019   • Depression 06/11/2019   • SOB (shortness of breath) 06/11/2019   • Left breast lump 06/11/2019   • Bipolar disorder ([facility]) 01/07/2013   • COPD (chronic obstructive pulmonary disease) ([facility]) 11/08/2011   • Fibrocystic breast disease 11/08/2011   • Hx of cervical malignancy 11/08/2011   • Dyspepsia  11/08/2011        Allergies: Nkda [no known drug allergy]    Current Outpatient Prescriptions   Medication Sig Dispense Refill   • FREESTYLE LITE strip USE 1 STRIP TO CHECK BLOOD SUGAR TWICE DAILY  0   • Blood Glucose Monitoring Suppl (FREESTYLE LITE) Device USE AS DIRECTED TO TEST BLOOD SUGAR  0   • nicotine (NICODERM) 14 MG/24HR PATCH 24 HR Apply 1 Patch to skin as directed every 24 hours. 14 Patch 0   • nicotine (NICODERM) 7 MG/24HR PATCH 24 HR Apply 1 Patch to skin as directed every 24 hours. 14 Patch 0   • Lancets Use one Freestyle Roxie lancet to test blood sugar twice daily. 100 Each 0   • traZODone (DESYREL) 100 MG Tab Take 100 mg by mouth every evening.     • vitamin D, Ergocalciferol, (DRISDOL) 92236 units Cap capsule Take 1 Cap by mouth every 7 days. 12 Cap 0   • Umeclidinium Bromide (INCRUSE ELLIPTA) 62.5 MCG/INH AEROSOL POWDER, BREATH ACTIVATED Inhale 1 Puff by mouth every day. 1 Each 11   • cyclobenzaprine (FLEXERIL) 10 MG Tab Take 1 Tab by mouth 2 times a day as needed. 60 Tab 5   • furosemide (LASIX) 20 MG Tab Take 1 Tab by mouth every day. 30 Tab 5   • NEXIUM 40 MG delayed-release capsule Take 1 Cap by mouth every morning before breakfast. 90 Cap 1   • vitamin D (CHOLECALCIFEROL) 1000 UNIT Tab Take 2,000 Units by mouth every day.     • hydrOXYzine HCl (ATARAX) 25 MG Tab Take 1 Tab by mouth 2 Times a Day. 30 Tab 2   • methylPREDNISolone (MEDROL) 4 MG Tab Take 1 Tab by mouth every day. 30 Tab 0   • simvastatin (ZOCOR) 40 MG Tab Take 1 Tab by mouth every evening. 30 Tab 3   • montelukast (SINGULAIR) 10 MG Tab Take 1 Tab by mouth every day. 30 Tab 5   • albuterol 108 (90 Base) MCG/ACT Aero Soln inhalation aerosol Inhale 2 Puffs by mouth every 6 hours as needed for Shortness of Breath. 1 Inhaler 0   • fluticasone-salmeterol (ADVAIR) 500-50 MCG/DOSE AEROSOL POWDER, BREATH ACTIVATED Inhale 1 Puff by mouth every 12 hours. 1 Inhaler 4   • propranolol (INDERAL) 20 MG Tab Take 1 Tab by mouth every day. 30 Tab  1   • FLUoxetine HCl 60 MG Tab Take 60 Tabs by mouth every day. 30 Tab 3   • BREO ELLIPTA 200-25 MCG/INH AEROSOL POWDER, BREATH ACTIVATED   6   • Blood Glucose Test Strips Use one Freestyle Roxie strip to test blood sugar twice daily. 100 Strip 0   • Alcohol Swabs Wipe site with prep pad prior to injection. 100 Each 0   • Blood Glucose Meter Kit Test blood sugar as recommended by provider. Freestyle Roxie blood glucose monitoring kit. 1 Kit 0     No current facility-administered medications for this visit.        Social History   Substance Use Topics   • Smoking status: Current Every Day Smoker     Packs/day: 0.50     Years: 33.00     Types: Cigarettes     Last attempt to quit: 2/8/2011   • Smokeless tobacco: Never Used      Comment: 5 cigs a day    • Alcohol use No     Social History     Social History Narrative   • No narrative on file       Family History   Problem Relation Age of Onset   • Lung Disease Mother    • Cancer Mother         cervical   • Heart Disease Mother    • Hypertension Mother    • Other Mother         migraines   • Lung Disease Father    • Asthma Father    • Psychiatry Father    • Cancer Sister         lung   • Cancer Maternal Grandmother 70        throat   • Hypertension Unknown    • Cancer Unknown    • Lung Disease Unknown    • Heart Disease Maternal Uncle    • Lung Disease Maternal Uncle    • Anemia Daughter        Allergies, past medical history, past surgical history, family history, social history reviewed and updated.    Review of Systems:      - Constitutional: Negative for fever, chills, unexpected weight change, and fatigue/generalized weakness.     - Respiratory: Negative for cough, sputum production, chest congestion, dyspnea, wheezing, and crackles.      - Cardiovascular: Negative for chest pain, palpitations, orthopnea, and bilateral lower extremity edema.     - Psychiatric/Behavioral: Negative for depression, suicidal/homicidal ideation and memory loss.      All other systems  "reviewed and are negative    Exam:    /80 (BP Location: Right arm, Patient Position: Sitting, BP Cuff Size: Adult)   Pulse 84   Temp 36.4 °C (97.6 °F) (Temporal)   Resp 20   Ht 1.638 m (5' 4.5\")   Wt 84 kg (185 lb 3 oz)   LMP  (LMP Unknown)   SpO2 96%   BMI 31.30 kg/m²  Body mass index is 31.3 kg/m².    Physical Exam:  Constitutional: Well-developed and well-nourished. Not diaphoretic. No distress.   Cardiovascular: Regular rate and rhythm, S1 and S2 without murmur, rubs, or gallops.    Chest: Effort normal. Clear to auscultation throughout. No adventitious sounds. No CVA tenderness.  Psychiatric:  Behavior, mood, and affect are appropriate.    Patient was seen for 25 minutes face to face of which, 20 minutes was spent counseling regarding today's visit problems, diagnoses and plan.    Assessment/Plan:  1. Encounter for smoking cessation counseling  - nicotine (NICODERM) 14 MG/24HR PATCH 24 HR; Apply 1 Patch to skin as directed every 24 hours.  Dispense: 14 Patch; Refill: 0  - nicotine (NICODERM) 7 MG/24HR PATCH 24 HR; Apply 1 Patch to skin as directed every 24 hours.  Dispense: 14 Patch; Refill: 0    2. Prediabetes  Well controlled.  Continue implement healthy lifestyle.  Discussed daily exercise, at least 30 minutes, encouraged weightbearing exercise for the benefit of bone density, also swimming and brisk walking.    Discussed with patient possible alternative diagnoses, pt is to take all medications as prescribed. If symptoms persist FU w/PCP, if symptoms worsen go to emergency room.  Reviewed indication, dosage, usage and potential adverse effects of prescribed medications. Reviewed risks and benefits of treatment plan. Patient verbalizes understanding of all instruction and verbally agrees to plan.    Return if symptoms worsen or fail to improve.  "

## 2019-08-24 DIAGNOSIS — J44.9 CHRONIC OBSTRUCTIVE PULMONARY DISEASE, UNSPECIFIED COPD TYPE (HCC): ICD-10-CM

## 2019-08-24 DIAGNOSIS — R73.03 PREDIABETES: ICD-10-CM

## 2019-08-24 DIAGNOSIS — R73.09 ELEVATED HEMOGLOBIN A1C: ICD-10-CM

## 2019-08-26 NOTE — TELEPHONE ENCOUNTER
Was the patient seen in the last year in this department? Yes    Does patient have an active prescription for medications requested? No     Received Request Via: Pharmacy

## 2019-08-27 RX ORDER — ALBUTEROL SULFATE 90 UG/1
2 AEROSOL, METERED RESPIRATORY (INHALATION) EVERY 6 HOURS PRN
Qty: 1 INHALER | Refills: 0 | Status: SHIPPED | OUTPATIENT
Start: 2019-08-27 | End: 2020-02-27 | Stop reason: SDUPTHER

## 2019-08-27 RX ORDER — GLUCOSAMINE HCL/CHONDROITIN SU 500-400 MG
CAPSULE ORAL
Qty: 100 EACH | Refills: 0 | Status: SHIPPED | OUTPATIENT
Start: 2019-08-27

## 2019-08-27 RX ORDER — LANCETS 30 GAUGE
EACH MISCELLANEOUS
Qty: 100 EACH | Refills: 0 | Status: SHIPPED | OUTPATIENT
Start: 2019-08-27

## 2019-08-28 DIAGNOSIS — F41.9 ANXIETY: ICD-10-CM

## 2019-08-29 RX ORDER — PROPRANOLOL HYDROCHLORIDE 20 MG/1
TABLET ORAL
Qty: 30 TAB | Refills: 1 | Status: SHIPPED | OUTPATIENT
Start: 2019-08-29 | End: 2021-06-03 | Stop reason: SDUPTHER

## 2019-12-17 DIAGNOSIS — F41.9 ANXIETY: ICD-10-CM

## 2019-12-17 RX ORDER — HYDROXYZINE HYDROCHLORIDE 25 MG/1
TABLET, FILM COATED ORAL
Qty: 180 TAB | Refills: 2 | Status: SHIPPED | OUTPATIENT
Start: 2019-12-17 | End: 2021-06-03

## 2020-01-21 ENCOUNTER — TELEPHONE (OUTPATIENT)
Dept: MEDICAL GROUP | Facility: PHYSICIAN GROUP | Age: 45
End: 2020-01-21

## 2020-01-21 NOTE — TELEPHONE ENCOUNTER
DOCUMENTATION OF PAR STATUS:    1. Name of Medication & Dose: NEXIUM 40 MG delayed-release capsule      2. Name of Prescription Coverage Company & phone #: Janett Gonzalez    3. Date Prior Auth Submitted: 1/21/20    4. What information was given to obtain insurance decision? Office notes    5. Prior Auth Status? 1/21/20Pending    6. Patient Notified: yes            Fax sent to the plan   Your PA has been faxed to the plan as a paper copy. Please contact the plan directly if you haven't received a determination in a typical timeframe.    You will be notified of the determination via fax.   How do I know if the plan approved the PA?   [x]Add Reminder to your Dashboard   Remind me in:         Download / Print PA Return to RequestVerify PrescribersReturn to Dashboard

## 2020-02-27 DIAGNOSIS — F32.A DEPRESSION, UNSPECIFIED DEPRESSION TYPE: ICD-10-CM

## 2020-02-27 DIAGNOSIS — J44.9 CHRONIC OBSTRUCTIVE PULMONARY DISEASE, UNSPECIFIED COPD TYPE (HCC): ICD-10-CM

## 2020-02-27 DIAGNOSIS — K21.9 GASTROESOPHAGEAL REFLUX DISEASE WITHOUT ESOPHAGITIS: ICD-10-CM

## 2020-02-27 DIAGNOSIS — E78.5 HYPERLIPIDEMIA, UNSPECIFIED HYPERLIPIDEMIA TYPE: ICD-10-CM

## 2020-02-27 RX ORDER — ALBUTEROL SULFATE 90 UG/1
2 AEROSOL, METERED RESPIRATORY (INHALATION) EVERY 6 HOURS PRN
Qty: 1 INHALER | Refills: 0 | Status: SHIPPED | OUTPATIENT
Start: 2020-02-27 | End: 2020-08-19 | Stop reason: SDUPTHER

## 2020-02-27 RX ORDER — SIMVASTATIN 40 MG
TABLET ORAL
Qty: 30 TAB | Refills: 0 | Status: SHIPPED | OUTPATIENT
Start: 2020-02-27 | End: 2020-05-12 | Stop reason: SDUPTHER

## 2020-02-27 RX ORDER — FLUOXETINE HYDROCHLORIDE 60 MG/1
TABLET, FILM COATED ORAL; ORAL
Qty: 30 TAB | Refills: 0 | Status: SHIPPED | OUTPATIENT
Start: 2020-02-27 | End: 2021-06-03 | Stop reason: SDUPTHER

## 2020-05-12 DIAGNOSIS — E78.5 HYPERLIPIDEMIA, UNSPECIFIED HYPERLIPIDEMIA TYPE: ICD-10-CM

## 2020-05-13 RX ORDER — SIMVASTATIN 40 MG
40 TABLET ORAL EVERY EVENING
Qty: 90 TAB | Refills: 0 | Status: SHIPPED | OUTPATIENT
Start: 2020-05-13 | End: 2021-06-03 | Stop reason: SDUPTHER

## 2020-06-02 DIAGNOSIS — J44.9 CHRONIC OBSTRUCTIVE PULMONARY DISEASE, UNSPECIFIED COPD TYPE (HCC): ICD-10-CM

## 2020-06-02 NOTE — TELEPHONE ENCOUNTER
Have we ever prescribed this med? Yes.  If yes, what date? 06/24/19    Last OV: 06/24/19 - [facility]    Next OV: none scheduled    DX: COPD    Medications: incruse

## 2020-08-19 DIAGNOSIS — J44.9 CHRONIC OBSTRUCTIVE PULMONARY DISEASE, UNSPECIFIED COPD TYPE (HCC): ICD-10-CM

## 2020-08-19 RX ORDER — ALBUTEROL SULFATE 90 UG/1
2 AEROSOL, METERED RESPIRATORY (INHALATION) EVERY 6 HOURS PRN
Qty: 1 EACH | Refills: 1 | Status: SHIPPED | OUTPATIENT
Start: 2020-08-19 | End: 2021-06-03 | Stop reason: SDUPTHER

## 2021-03-16 ENCOUNTER — APPOINTMENT (OUTPATIENT)
Dept: RADIOLOGY | Facility: MEDICAL CENTER | Age: 46
End: 2021-03-16
Attending: EMERGENCY MEDICINE
Payer: MEDICARE

## 2021-03-16 ENCOUNTER — HOSPITAL ENCOUNTER (EMERGENCY)
Facility: MEDICAL CENTER | Age: 46
End: 2021-03-16
Attending: EMERGENCY MEDICINE
Payer: MEDICARE

## 2021-03-16 VITALS
HEIGHT: 64 IN | HEART RATE: 82 BPM | DIASTOLIC BLOOD PRESSURE: 50 MMHG | WEIGHT: 186.73 LBS | BODY MASS INDEX: 31.88 KG/M2 | TEMPERATURE: 97.5 F | RESPIRATION RATE: 18 BRPM | SYSTOLIC BLOOD PRESSURE: 97 MMHG | OXYGEN SATURATION: 93 %

## 2021-03-16 DIAGNOSIS — R10.32 LLQ ABDOMINAL PAIN: ICD-10-CM

## 2021-03-16 LAB
ALBUMIN SERPL BCP-MCNC: 4 G/DL (ref 3.2–4.9)
ALBUMIN/GLOB SERPL: 1.5 G/DL
ALP SERPL-CCNC: 59 U/L (ref 30–99)
ALT SERPL-CCNC: 12 U/L (ref 2–50)
ANION GAP SERPL CALC-SCNC: 7 MMOL/L (ref 7–16)
APPEARANCE UR: ABNORMAL
AST SERPL-CCNC: 11 U/L (ref 12–45)
BACTERIA #/AREA URNS HPF: ABNORMAL /HPF
BASOPHILS # BLD AUTO: 0.4 % (ref 0–1.8)
BASOPHILS # BLD: 0.03 K/UL (ref 0–0.12)
BILIRUB SERPL-MCNC: 0.3 MG/DL (ref 0.1–1.5)
BILIRUB UR QL STRIP.AUTO: NEGATIVE
BUN SERPL-MCNC: 13 MG/DL (ref 8–22)
CALCIUM SERPL-MCNC: 9.1 MG/DL (ref 8.5–10.5)
CHLORIDE SERPL-SCNC: 107 MMOL/L (ref 96–112)
CO2 SERPL-SCNC: 26 MMOL/L (ref 20–33)
COLOR UR: YELLOW
CREAT SERPL-MCNC: 0.79 MG/DL (ref 0.5–1.4)
EOSINOPHIL # BLD AUTO: 0.27 K/UL (ref 0–0.51)
EOSINOPHIL NFR BLD: 3.2 % (ref 0–6.9)
EPI CELLS #/AREA URNS HPF: ABNORMAL /HPF
ERYTHROCYTE [DISTWIDTH] IN BLOOD BY AUTOMATED COUNT: 45.6 FL (ref 35.9–50)
GLOBULIN SER CALC-MCNC: 2.6 G/DL (ref 1.9–3.5)
GLUCOSE SERPL-MCNC: 101 MG/DL (ref 65–99)
GLUCOSE UR STRIP.AUTO-MCNC: NEGATIVE MG/DL
HCT VFR BLD AUTO: 43.1 % (ref 37–47)
HGB BLD-MCNC: 14.3 G/DL (ref 12–16)
HYALINE CASTS #/AREA URNS LPF: ABNORMAL /LPF
IMM GRANULOCYTES # BLD AUTO: 0.01 K/UL (ref 0–0.11)
IMM GRANULOCYTES NFR BLD AUTO: 0.1 % (ref 0–0.9)
KETONES UR STRIP.AUTO-MCNC: NEGATIVE MG/DL
LEUKOCYTE ESTERASE UR QL STRIP.AUTO: NEGATIVE
LIPASE SERPL-CCNC: 42 U/L (ref 11–82)
LYMPHOCYTES # BLD AUTO: 2.72 K/UL (ref 1–4.8)
LYMPHOCYTES NFR BLD: 32.5 % (ref 22–41)
MCH RBC QN AUTO: 29.1 PG (ref 27–33)
MCHC RBC AUTO-ENTMCNC: 33.2 G/DL (ref 33.6–35)
MCV RBC AUTO: 87.6 FL (ref 81.4–97.8)
MICRO URNS: ABNORMAL
MONOCYTES # BLD AUTO: 0.41 K/UL (ref 0–0.85)
MONOCYTES NFR BLD AUTO: 4.9 % (ref 0–13.4)
NEUTROPHILS # BLD AUTO: 4.92 K/UL (ref 2–7.15)
NEUTROPHILS NFR BLD: 58.9 % (ref 44–72)
NITRITE UR QL STRIP.AUTO: POSITIVE
NRBC # BLD AUTO: 0 K/UL
NRBC BLD-RTO: 0 /100 WBC
PH UR STRIP.AUTO: 5 [PH] (ref 5–8)
PLATELET # BLD AUTO: 231 K/UL (ref 164–446)
PMV BLD AUTO: 9.5 FL (ref 9–12.9)
POTASSIUM SERPL-SCNC: 3.4 MMOL/L (ref 3.6–5.5)
PROT SERPL-MCNC: 6.6 G/DL (ref 6–8.2)
PROT UR QL STRIP: NEGATIVE MG/DL
RBC # BLD AUTO: 4.92 M/UL (ref 4.2–5.4)
RBC # URNS HPF: ABNORMAL /HPF
RBC UR QL AUTO: ABNORMAL
SODIUM SERPL-SCNC: 140 MMOL/L (ref 135–145)
SP GR UR STRIP.AUTO: 1.02
UROBILINOGEN UR STRIP.AUTO-MCNC: 0.2 MG/DL
WBC # BLD AUTO: 8.4 K/UL (ref 4.8–10.8)
WBC #/AREA URNS HPF: ABNORMAL /HPF

## 2021-03-16 PROCEDURE — 96375 TX/PRO/DX INJ NEW DRUG ADDON: CPT

## 2021-03-16 PROCEDURE — 74177 CT ABD & PELVIS W/CONTRAST: CPT | Mod: MG

## 2021-03-16 PROCEDURE — 96374 THER/PROPH/DIAG INJ IV PUSH: CPT

## 2021-03-16 PROCEDURE — 81001 URINALYSIS AUTO W/SCOPE: CPT

## 2021-03-16 PROCEDURE — 80053 COMPREHEN METABOLIC PANEL: CPT

## 2021-03-16 PROCEDURE — 99285 EMERGENCY DEPT VISIT HI MDM: CPT

## 2021-03-16 PROCEDURE — 36415 COLL VENOUS BLD VENIPUNCTURE: CPT

## 2021-03-16 PROCEDURE — 700111 HCHG RX REV CODE 636 W/ 250 OVERRIDE (IP): Performed by: EMERGENCY MEDICINE

## 2021-03-16 PROCEDURE — 700117 HCHG RX CONTRAST REV CODE 255: Performed by: EMERGENCY MEDICINE

## 2021-03-16 PROCEDURE — 85025 COMPLETE CBC W/AUTO DIFF WBC: CPT

## 2021-03-16 PROCEDURE — 83690 ASSAY OF LIPASE: CPT

## 2021-03-16 RX ORDER — ONDANSETRON 4 MG/1
4 TABLET, ORALLY DISINTEGRATING ORAL EVERY 6 HOURS PRN
Qty: 5 TABLET | Refills: 0 | Status: SHIPPED | OUTPATIENT
Start: 2021-03-16 | End: 2021-06-03

## 2021-03-16 RX ORDER — MORPHINE SULFATE 4 MG/ML
4 INJECTION, SOLUTION INTRAMUSCULAR; INTRAVENOUS ONCE
Status: COMPLETED | OUTPATIENT
Start: 2021-03-16 | End: 2021-03-16

## 2021-03-16 RX ORDER — KETOROLAC TROMETHAMINE 30 MG/ML
15 INJECTION, SOLUTION INTRAMUSCULAR; INTRAVENOUS ONCE
Status: DISCONTINUED | OUTPATIENT
Start: 2021-03-16 | End: 2021-03-16 | Stop reason: HOSPADM

## 2021-03-16 RX ORDER — ONDANSETRON 2 MG/ML
4 INJECTION INTRAMUSCULAR; INTRAVENOUS ONCE
Status: COMPLETED | OUTPATIENT
Start: 2021-03-16 | End: 2021-03-16

## 2021-03-16 RX ADMIN — ONDANSETRON 4 MG: 2 INJECTION INTRAMUSCULAR; INTRAVENOUS at 17:05

## 2021-03-16 RX ADMIN — IOHEXOL 90 ML: 350 INJECTION, SOLUTION INTRAVENOUS at 19:41

## 2021-03-16 RX ADMIN — MORPHINE SULFATE 4 MG: 4 INJECTION INTRAVENOUS at 17:05

## 2021-03-16 ASSESSMENT — LIFESTYLE VARIABLES: DO YOU DRINK ALCOHOL: NO

## 2021-03-16 ASSESSMENT — FIBROSIS 4 INDEX: FIB4 SCORE: 0.79

## 2021-03-16 NOTE — ED TRIAGE NOTES
"Chief Complaint   Patient presents with   • Abdominal Pain     LLQ abdominal pain x one day     /87   Pulse 99   Temp 36.4 °C (97.5 °F) (Temporal)   Resp 18   Ht 1.626 m (5' 4\")   Wt 84.7 kg (186 lb 11.7 oz)   LMP  (LMP Unknown)   SpO2 98%   BMI 32.05 kg/m²     45 y/o female presents to ED with complaint of LLQ abdominal pain since yesterday evening.  Describes pain as sharp. Tried ibuprofen at home without relief. No fevers, no dysuria, no N/V/D, no constipation.   "

## 2021-03-16 NOTE — ED PROVIDER NOTES
ED Provider Note    CHIEF COMPLAINT  Chief Complaint   Patient presents with   • Abdominal Pain     LLQ abdominal pain x one day        HPI    Primary care provider: MARY Desouza   History obtained from: Patient and   History limited by: None     Ynes Saima Beard is a 46 y.o. female who presents to the ED with  complaining of sudden onset of left lower quadrant abdominal pain since yesterday described as constant and feeling like she has gas.  She reports feeling slightly better in the fetal position but otherwise has not noticed anything that has helped with her pain.  She took some ibuprofen earlier today without improvement.  She has had some nausea without vomiting.  She denies fever/chills/congestion/sore throat/cough/shortness of breath or difficulty breathing.  She reports normal bowel movements and stools without gross blood noted.  She reports normal urination without hematuria.  She has not noticed any rash.  She denies radiation of this pain or pain anywhere else.  Patient with previous hysterectomy but reports that she still has her ovaries.  She has also had previous appendectomy and cholecystectomy.    REVIEW OF SYSTEMS  Please see HPI for pertinent positives/negatives.  All other systems reviewed and are negative.     PAST MEDICAL HISTORY  Past Medical History:   Diagnosis Date   • Fibrocystic breast disease 11/8/2011   • Hx of cervical malignancy 11/8/2011   • COPD (chronic obstructive pulmonary disease) (HCC) 2009   • Bipolar 1 disorder (HCC) 2002   • Cancer (HCC) 2001    br CA   • Anemia    • Anxiety    • Arthritis    • ASTHMA    • Bronchitis    • Fibrocystic breast    • Hemorrhoids    • Hyperlipidemia    • Migraine    • Pneumonia         SURGICAL HISTORY  Past Surgical History:   Procedure Laterality Date   • BREAST IMPLANT REVISION  1/19/2012    Performed by ADAM CHOWDHURY at [facility]   • CAPSULECTOMY  11/22/2011    Performed by ADAM CHOWDHURY at  "[facility]   • BREAST IMPLANT REVISION  11/22/2011    Performed by ADAM CHOWDHURY at [facility]   • BREAST IMPLANT REMOVAL  2/13/2009    Performed by ANASTASIA WILKERSON at [facility]   • BREAST IMPLANT REVISION  2/13/2009    Performed by ANASTASIA WILKERSON at [facility]   • CAPSULECTOMY  2/13/2009    Performed by ANASTASIA WILKERSON at [facility] ORS   • HYSTERECTOMY, TOTAL ABDOMINAL  2004   • MASTECTOMY  2001    with reduction   • ABDOMINAL HYSTERECTOMY TOTAL     • APPENDECTOMY     • CHOLECYSTECTOMY          SOCIAL HISTORY  Social History     Tobacco Use   • Smoking status: Current Every Day Smoker     Packs/day: 0.50     Years: 33.00     Pack years: 16.50     Types: Cigarettes     Last attempt to quit: 2/8/2011     Years since quitting: 10.1   • Smokeless tobacco: Never Used   • Tobacco comment: 5 cigs a day    Substance and Sexual Activity   • Alcohol use: No   • Drug use: No   • Sexual activity: Yes     Partners: Male        FAMILY HISTORY  Family History   Problem Relation Age of Onset   • Lung Disease Mother    • Cancer Mother         cervical   • Heart Disease Mother    • Hypertension Mother    • Other Mother         migraines   • Lung Disease Father    • Asthma Father    • Psychiatric Illness Father    • Cancer Sister         lung   • Cancer Maternal Grandmother 70        throat   • Hypertension Other    • Cancer Other    • Lung Disease Other    • Heart Disease Maternal Uncle    • Lung Disease Maternal Uncle    • Anemia Daughter         CURRENT MEDICATIONS  Home Medications    **Home medications have not yet been reviewed for this encounter**          ALLERGIES  No Known Allergies     PHYSICAL EXAM  VITAL SIGNS: BP (!) 97/50   Pulse 82   Temp 36.4 °C (97.5 °F) (Temporal)   Resp 18   Ht 1.626 m (5' 4\")   Wt 84.7 kg (186 lb 11.7 oz)   LMP  (LMP Unknown)   SpO2 93%   BMI 32.05 kg/m²  @TOSHIA[[id number]::@     Pulse ox interpretation: 98% I " interpret this pulse ox as normal     Constitutional: Well developed, well nourished, alert in no apparent distress, nontoxic appearance    HENT: No external signs of trauma, normocephalic, mask on due to COVID-19 pandemic  Eyes: PERRL, conjunctiva without erythema, no discharge, no icterus    Neck: Soft and supple, trachea midline, no stridor, no tenderness, no LAD, no JVD, good ROM    Cardiovascular: Regular rate and rhythm, no murmurs/rubs/gallops, strong distal pulses and good perfusion    Thorax & Lungs: No respiratory distress, CTAB   Abdomen: Soft, left lower quadrant tenderness to palpation, nondistended, no guarding, no rebound, normal BS    Back: No CVAT    Extremities: No cyanosis, no edema, no gross deformity, good ROM, no tenderness, intact distal pulses with brisk cap refill    Skin: Warm, dry, no pallor/cyanosis, no rash noted    Lymphatic: No lymphadenopathy noted    Neuro: A/O times 3, no focal deficits noted, ambulating without difficulty  Psychiatric: Cooperative, normal mood and affect, normal judgement, appropriate for clinical situation        DIAGNOSTIC STUDIES / PROCEDURES        LABS  All labs reviewed by me.     Results for orders placed or performed during the hospital encounter of 03/16/21   CBC WITH DIFFERENTIAL   Result Value Ref Range    WBC 8.4 4.8 - 10.8 K/uL    RBC 4.92 4.20 - 5.40 M/uL    Hemoglobin 14.3 12.0 - 16.0 g/dL    Hematocrit 43.1 37.0 - 47.0 %    MCV 87.6 81.4 - 97.8 fL    MCH 29.1 27.0 - 33.0 pg    MCHC 33.2 (L) 33.6 - 35.0 g/dL    RDW 45.6 35.9 - 50.0 fL    Platelet Count 231 164 - 446 K/uL    MPV 9.5 9.0 - 12.9 fL    Neutrophils-Polys 58.90 44.00 - 72.00 %    Lymphocytes 32.50 22.00 - 41.00 %    Monocytes 4.90 0.00 - 13.40 %    Eosinophils 3.20 0.00 - 6.90 %    Basophils 0.40 0.00 - 1.80 %    Immature Granulocytes 0.10 0.00 - 0.90 %    Nucleated RBC 0.00 /100 WBC    Neutrophils (Absolute) 4.92 2.00 - 7.15 K/uL    Lymphs (Absolute) 2.72 1.00 - 4.80 K/uL    Monos  (Absolute) 0.41 0.00 - 0.85 K/uL    Eos (Absolute) 0.27 0.00 - 0.51 K/uL    Baso (Absolute) 0.03 0.00 - 0.12 K/uL    Immature Granulocytes (abs) 0.01 0.00 - 0.11 K/uL    NRBC (Absolute) 0.00 K/uL   COMP METABOLIC PANEL   Result Value Ref Range    Sodium 140 135 - 145 mmol/L    Potassium 3.4 (L) 3.6 - 5.5 mmol/L    Chloride 107 96 - 112 mmol/L    Co2 26 20 - 33 mmol/L    Anion Gap 7.0 7.0 - 16.0    Glucose 101 (H) 65 - 99 mg/dL    Bun 13 8 - 22 mg/dL    Creatinine 0.79 0.50 - 1.40 mg/dL    Calcium 9.1 8.5 - 10.5 mg/dL    AST(SGOT) 11 (L) 12 - 45 U/L    ALT(SGPT) 12 2 - 50 U/L    Alkaline Phosphatase 59 30 - 99 U/L    Total Bilirubin 0.3 0.1 - 1.5 mg/dL    Albumin 4.0 3.2 - 4.9 g/dL    Total Protein 6.6 6.0 - 8.2 g/dL    Globulin 2.6 1.9 - 3.5 g/dL    A-G Ratio 1.5 g/dL   LIPASE   Result Value Ref Range    Lipase 42 11 - 82 U/L   URINALYSIS (UA)    Specimen: Urine, Clean Catch   Result Value Ref Range    Color Yellow     Character Cloudy (A)     Specific Gravity 1.024 <1.035    Ph 5.0 5.0 - 8.0    Glucose Negative Negative mg/dL    Ketones Negative Negative mg/dL    Protein Negative Negative mg/dL    Bilirubin Negative Negative    Urobilinogen, Urine 0.2 Negative    Nitrite Positive (A) Negative    Leukocyte Esterase Negative Negative    Occult Blood Trace (A) Negative    Micro Urine Req Microscopic    URINE MICROSCOPIC (W/UA)   Result Value Ref Range    WBC 10-20 (A) /hpf    RBC 0-2 /hpf    Bacteria Many (A) None /hpf    Epithelial Cells Moderate (A) /hpf    Hyaline Cast 3-5 (A) /lpf   ESTIMATED GFR   Result Value Ref Range    GFR If African American >60 >60 mL/min/1.73 m 2    GFR If Non African American >60 >60 mL/min/1.73 m 2        RADIOLOGY  The radiologist's interpretation of all radiological studies have been reviewed by me.     CT-ABDOMEN-PELVIS WITH   Final Result         1. No acute inflammatory change identified in the abdomen or pelvis.      2. There is a 1.9 x 2.6 cm low-attenuation lesion in the right  lower quadrant adjacent to the cecum and area of the appendectomy. This could be a normal ovary and further evaluation with ultrasound is recommended.             COURSE & MEDICAL DECISION MAKING  Nursing notes, VS, PMSFHx reviewed in chart.     Review of past medical records shows the patient without recent visits to this ED.      Differential diagnoses considered include but are not limited to: AAA, bowel perforation/obstruction, ileus, diverticulitis, KS/renal colic, colitis, muscle strain, hernia, PID, endometriosis, ovarian cyst/torsion, porphyria       History and physical exam as above.  Labs are fairly unremarkable except for equivocal findings on the UA with moderate epithelial cells limiting its utility.  Patient also denied dysuria and therefore this is unlikely to be acute UTI.  CT abdomen/pelvis with findings as above.  Patient was treated with Zofran and morphine with some improvement of her symptoms.  No further vomiting in the ED.  I discussed the findings with the patient and .  She is noted to be in no acute distress and nontoxic in appearance without evidence for acute surgical abdomen.  Patient declined pelvic exam and ultrasound.  She will be given a dose of Toradol prior to discharge and will be discharged home with prescription of Zofran.  She was advised on outpatient follow-up and given return to ED precautions.  Patient verbalized understanding and agreed with plan of care with no further questions or concerns.      The patient is referred to a primary physician for blood pressure management, diabetic screening, and for all other preventative health concerns.       FINAL IMPRESSION  1. LLQ abdominal pain Acute          DISPOSITION  Patient will be discharged home in stable condition.       FOLLOW UP  Please follow up with your doctor    Schedule an appointment as soon as possible for a visit       [facility], Emergency Dept  [address]  [id number]  [phone]    If symptoms worsen         OUTPATIENT MEDICATIONS  Discharge Medication List as of 3/16/2021  8:17 PM      START taking these medications    Details   ondansetron (ZOFRAN ODT) 4 MG TABLET DISPERSIBLE Take 1 tablet by mouth every 6 hours as needed., Disp-5 tablet, R-0, Print Rx Paper                Electronically signed by: Rah Arias D.O., 3/16/2021 4:41 PM      Portions of this record were made with voice recognition software.  Despite my review, spelling/grammar/context errors may still remain.  Interpretation of this chart should be taken in this context.

## 2021-03-17 NOTE — ED NOTES
Pt d/c from ED a/o x 4 GCS 15 ambulatory without assistance with steady gait. PIV removed with catheter intact. Pt given d/c instructions w/ prescription and verbalized understanding.

## 2021-03-17 NOTE — ED NOTES
Pt updated on time for CT.  Pt reports pain starting to increase.  No request for pain medication at this time.

## 2021-05-13 ENCOUNTER — NON-PROVIDER VISIT (OUTPATIENT)
Dept: OCCUPATIONAL MEDICINE | Facility: CLINIC | Age: 46
End: 2021-05-13

## 2021-05-13 DIAGNOSIS — Z02.1 PRE-EMPLOYMENT DRUG SCREENING: ICD-10-CM

## 2021-05-13 LAB
AMP AMPHETAMINE: NORMAL
COC COCAINE: NORMAL
INT CON NEG: NORMAL
INT CON POS: NORMAL
MET METHAMPHETAMINES: NORMAL
OPI OPIATES: NORMAL
PCP PHENCYCLIDINE: NORMAL
POC DRUG COMMENT 753798-OCCUPATIONAL HEALTH: NEGATIVE
THC: NORMAL

## 2021-05-13 PROCEDURE — 80305 DRUG TEST PRSMV DIR OPT OBS: CPT | Performed by: NURSE PRACTITIONER

## 2021-05-19 DIAGNOSIS — J44.9 CHRONIC OBSTRUCTIVE PULMONARY DISEASE, UNSPECIFIED COPD TYPE (HCC): ICD-10-CM

## 2021-05-20 ENCOUNTER — PATIENT MESSAGE (OUTPATIENT)
Dept: SLEEP MEDICINE | Facility: MEDICAL CENTER | Age: 46
End: 2021-05-20

## 2021-05-20 DIAGNOSIS — J44.9 CHRONIC OBSTRUCTIVE PULMONARY DISEASE, UNSPECIFIED COPD TYPE (HCC): ICD-10-CM

## 2021-05-20 NOTE — PATIENT COMMUNICATION
"Last seen 6/24/19 Dr. Cobos   \"Return in about 8 weeks (around 8/19/2019) for copd .\"    No pending appt. Patient is trying to schedule \"annual PFT and visit\" No pft ordered at last ov. Please advise if testing needed.   "

## 2021-06-03 ENCOUNTER — OFFICE VISIT (OUTPATIENT)
Dept: MEDICAL GROUP | Facility: PHYSICIAN GROUP | Age: 46
End: 2021-06-03
Payer: MEDICARE

## 2021-06-03 VITALS
BODY MASS INDEX: 31.32 KG/M2 | TEMPERATURE: 99.1 F | SYSTOLIC BLOOD PRESSURE: 112 MMHG | WEIGHT: 188 LBS | DIASTOLIC BLOOD PRESSURE: 84 MMHG | HEIGHT: 65 IN | OXYGEN SATURATION: 97 % | HEART RATE: 82 BPM | RESPIRATION RATE: 12 BRPM

## 2021-06-03 DIAGNOSIS — E78.2 MIXED HYPERLIPIDEMIA: ICD-10-CM

## 2021-06-03 DIAGNOSIS — K21.9 GASTROESOPHAGEAL REFLUX DISEASE WITHOUT ESOPHAGITIS: ICD-10-CM

## 2021-06-03 DIAGNOSIS — F32.A DEPRESSION, UNSPECIFIED DEPRESSION TYPE: ICD-10-CM

## 2021-06-03 DIAGNOSIS — J44.9 CHRONIC OBSTRUCTIVE PULMONARY DISEASE, UNSPECIFIED COPD TYPE (HCC): ICD-10-CM

## 2021-06-03 DIAGNOSIS — Z96.612 PRESENCE OF LEFT ARTIFICIAL SHOULDER JOINT: ICD-10-CM

## 2021-06-03 DIAGNOSIS — M62.838 MUSCLE SPASM OF LEFT SHOULDER AREA: ICD-10-CM

## 2021-06-03 DIAGNOSIS — F41.9 ANXIETY: ICD-10-CM

## 2021-06-03 DIAGNOSIS — Z12.31 ENCOUNTER FOR SCREENING MAMMOGRAM FOR MALIGNANT NEOPLASM OF BREAST: ICD-10-CM

## 2021-06-03 DIAGNOSIS — E78.5 HYPERLIPIDEMIA, UNSPECIFIED HYPERLIPIDEMIA TYPE: ICD-10-CM

## 2021-06-03 PROCEDURE — 99214 OFFICE O/P EST MOD 30 MIN: CPT | Performed by: NURSE PRACTITIONER

## 2021-06-03 RX ORDER — ESOMEPRAZOLE MAGNESIUM 40 MG/1
40 CAPSULE, DELAYED RELEASE ORAL
Qty: 90 CAPSULE | Refills: 3 | Status: SHIPPED | OUTPATIENT
Start: 2021-06-03

## 2021-06-03 RX ORDER — ALBUTEROL SULFATE 90 UG/1
2 AEROSOL, METERED RESPIRATORY (INHALATION) EVERY 6 HOURS PRN
Qty: 1 EACH | Refills: 4 | Status: SHIPPED | OUTPATIENT
Start: 2021-06-03

## 2021-06-03 RX ORDER — MORPHINE SULFATE 4 MG/ML
INJECTION, SOLUTION INTRAMUSCULAR; INTRAVENOUS
COMMUNITY
Start: 2021-03-16 | End: 2021-06-03

## 2021-06-03 RX ORDER — ONDANSETRON 2 MG/ML
INJECTION INTRAMUSCULAR; INTRAVENOUS
COMMUNITY
Start: 2021-03-16 | End: 2021-06-03

## 2021-06-03 RX ORDER — FLUOXETINE HYDROCHLORIDE 60 MG/1
1 TABLET, FILM COATED ORAL; ORAL DAILY
Qty: 90 TABLET | Refills: 3 | Status: SHIPPED | OUTPATIENT
Start: 2021-06-03

## 2021-06-03 RX ORDER — SIMVASTATIN 40 MG
40 TABLET ORAL EVERY EVENING
Qty: 90 TABLET | Refills: 3 | Status: SHIPPED | OUTPATIENT
Start: 2021-06-03 | End: 2022-02-15 | Stop reason: SDUPTHER

## 2021-06-03 RX ORDER — PROPRANOLOL HYDROCHLORIDE 20 MG/1
20 TABLET ORAL 2 TIMES DAILY PRN
Qty: 180 TABLET | Refills: 3 | Status: SHIPPED | OUTPATIENT
Start: 2021-06-03 | End: 2021-06-18

## 2021-06-03 RX ORDER — CYCLOBENZAPRINE HCL 10 MG
10 TABLET ORAL 2 TIMES DAILY PRN
Qty: 60 TABLET | Refills: 5 | Status: SHIPPED | OUTPATIENT
Start: 2021-06-03

## 2021-06-03 RX ORDER — MONTELUKAST SODIUM 10 MG/1
10 TABLET ORAL DAILY
Qty: 90 TABLET | Refills: 3 | Status: SHIPPED | OUTPATIENT
Start: 2021-06-03

## 2021-06-03 ASSESSMENT — FIBROSIS 4 INDEX: FIB4 SCORE: .6598288790738580166

## 2021-06-03 NOTE — ASSESSMENT & PLAN NOTE
Chronic problem.  This is well controlled on fluoxetine 60 mg, needing refills today.  She denies anxiety, depression or SI.

## 2021-06-03 NOTE — ASSESSMENT & PLAN NOTE
Results for JUSTYN MONTES (MRN [id number]) as of 6/3/2021 13:46   Ref. Range 6/27/2019 15:54   Cholesterol,Tot Latest Ref Range: 100 - 199 mg/dL 232 (H)   Triglycerides Latest Ref Range: 0 - 149 mg/dL 81   HDL Latest Ref Range: >=40 mg/dL 37 (A)   LDL Latest Ref Range: <100 mg/dL 179 (H)   On simvastatin 40 mg, tolerating well, needing refills today.  Last lipid panel from 2019.  Patient will go get this done in the next 2 weeks.  She denies CP, dyspnea, dizziness or peripheral edema.

## 2021-06-03 NOTE — ASSESSMENT & PLAN NOTE
Chronic problem.  In the spasms in the left shoulder, controlled with Flexeril 10 mg twice a day.  Patient denies side effects such as drowsiness or weakness.  Patient states that pain in her left shoulder is getting worse than prior to surgery, associated symptoms include with mild weakness and restricted range of motion.  S/p Rotator cuff repair a few years ago.

## 2021-06-03 NOTE — PROGRESS NOTES
Chief Complaint   Patient presents with   • Establish Care       HISTORY OF PRESENT ILLNESS: Patient is a 46 y.o. female, established patient who presents today to discuss medical problems as listed below:    Health Maintenance:  COMPLETED     COPD (chronic obstructive pulmonary disease)  Chronic problem.  Patient is seeing pulmonology, PRACHI Teran.  She is on albuterol and Advair, needing refills today.  She denies CP, dyspnea, cough.    Depression  Chronic problem.  This is well controlled on fluoxetine 60 mg, needing refills today.  She denies anxiety, depression or SI.    GERD (gastroesophageal reflux disease)  Chronic problem.  This is well controlled with Nexium 40 mg.  Patient is interested in a brand name of Nexium as generic does not work for her in terms of controlling her symptoms.  She denies epigastric pain, nausea or blood in stool.    Hyperlipidemia  Results for JUSTYN MONTES (MRN [id number]) as of 6/3/2021 13:46   Ref. Range 6/27/2019 15:54   Cholesterol,Tot Latest Ref Range: 100 - 199 mg/dL 232 (H)   Triglycerides Latest Ref Range: 0 - 149 mg/dL 81   HDL Latest Ref Range: >=40 mg/dL 37 (A)   LDL Latest Ref Range: <100 mg/dL 179 (H)   On simvastatin 40 mg, tolerating well, needing refills today.  Last lipid panel from 2019.  Patient will go get this done in the next 2 weeks.  She denies CP, dyspnea, dizziness or peripheral edema.    Anxiety  Chronic problem.  Patient is currently on propranolol 20 mg and would like a refill today.    Muscle spasm of left shoulder area  Chronic problem.  In the spasms in the left shoulder, controlled with Flexeril 10 mg twice a day.  Patient denies side effects such as drowsiness or weakness.  Patient states that pain in her left shoulder is getting worse than prior to surgery.  S/p Rotator cuff repair a few years ago.      Patient Active Problem List    Diagnosis Date Noted   • Encounter for smoking cessation counseling 07/18/2019   • Elevated hemoglobin A1c  07/09/2019   • Prediabetes 07/03/2019   • Pelvic pain 06/17/2019   • Encounter to establish care 06/11/2019   • Family history of diabetes mellitus 06/11/2019   • Vitamin D deficiency 06/11/2019   • Hyperlipidemia 06/11/2019   • History of endometriosis 06/11/2019   • Anxiety 06/11/2019   • Muscle spasm of left shoulder area 06/11/2019   • Water retention 06/11/2019   • GERD (gastroesophageal reflux disease) 06/11/2019   • Depression 06/11/2019   • SOB (shortness of breath) 06/11/2019   • Left breast lump 06/11/2019   • Bipolar disorder ([facility]) 01/07/2013   • COPD (chronic obstructive pulmonary disease) ([facility]) 11/08/2011   • Fibrocystic breast disease 11/08/2011   • Hx of cervical malignancy 11/08/2011   • Dyspepsia 11/08/2011        Allergies: Patient has no known allergies.    Current Outpatient Medications   Medication Sig Dispense Refill   • albuterol 108 (90 Base) MCG/ACT Aero Soln inhalation aerosol Inhale 2 Puffs every 6 hours as needed for Shortness of Breath. 1 Each 4   • fluticasone-salmeterol (ADVAIR) 500-50 MCG/DOSE AEROSOL POWDER, BREATH ACTIVATED Inhale 1 Puff every 12 hours. 60 Each 6   • FLUoxetine HCl 60 MG Tab Take 1 tablet by mouth every day. 90 tablet 3   • esomeprazole (NEXIUM) 40 MG delayed-release capsule Take 1 capsule by mouth every morning before breakfast. 90 capsule 3   • montelukast (SINGULAIR) 10 MG Tab Take 1 tablet by mouth every day. 90 tablet 3   • simvastatin (ZOCOR) 40 MG Tab Take 1 tablet by mouth every evening. 90 tablet 3   • propranolol (INDERAL) 20 MG Tab Take 1 tablet by mouth 2 times a day as needed. 180 tablet 3   • cyclobenzaprine (FLEXERIL) 10 mg Tab Take 1 tablet by mouth 2 times a day as needed. 60 tablet 5   • Umeclidinium Bromide (INCRUSE ELLIPTA) 62.5 MCG/INH AEROSOL POWDER, BREATH ACTIVATED Inhale 1 Puff every day. 1 Each 0   • Blood Glucose Test Strips Use one Freestyle Roxie strip to test blood sugar twice daily. 100 Strip 0   • Lancets Use one Freestyle Roxie  lancet to test blood sugar twice daily. 100 Each 0   • Alcohol Swabs Wipe site with prep pad prior to injection. 100 Each 0   • FREESTYLE LITE strip USE 1 STRIP TO CHECK BLOOD SUGAR TWICE DAILY  0   • Blood Glucose Monitoring Suppl (FREESTYLE LITE) Device USE AS DIRECTED TO TEST BLOOD SUGAR  0   • Blood Glucose Meter Kit Test blood sugar as recommended by provider. Freestyle Roxie blood glucose monitoring kit. 1 Kit 0   • furosemide (LASIX) 20 MG Tab Take 1 Tab by mouth every day. 30 Tab 5   • vitamin D (CHOLECALCIFEROL) 1000 UNIT Tab Take 2,000 Units by mouth every day.       No current facility-administered medications for this visit.       Social History     Tobacco Use   • Smoking status: Current Every Day Smoker     Packs/day: 0.50     Years: 33.00     Pack years: 16.50     Types: Cigarettes     Last attempt to quit: 2/8/2011     Years since quitting: 10.3   • Smokeless tobacco: Never Used   • Tobacco comment: 5 cigs a day    Vaping Use   • Vaping Use: Former   Substance Use Topics   • Alcohol use: Yes     Comment: occasional   • Drug use: No     Social History     Social History Narrative   • Not on file       Family History   Problem Relation Age of Onset   • Lung Disease Mother    • Cancer Mother         cervical   • Heart Disease Mother    • Hypertension Mother    • Other Mother         migraines   • Lung Disease Father    • Asthma Father    • Psychiatric Illness Father    • Cancer Sister         lung   • Cancer Maternal Grandmother 70        throat   • Hypertension Other    • Cancer Other    • Lung Disease Other    • Heart Disease Maternal Uncle    • Lung Disease Maternal Uncle    • Anemia Daughter        Allergies, past medical history, past surgical history, family history, social history reviewed and updated.    Review of Systems:     - Constitutional: Negative for fever, chills, unexpected weight change, and fatigue/generalized weakness.     - HEENT: Negative for headaches, vision changes, hearing  "changes, ear pain, ear discharge, rhinorrhea, sinus congestion, sore throat, and neck pain.      - Respiratory: Negative for cough, sputum production, chest congestion, dyspnea, wheezing, and crackles.      - Cardiovascular: Negative for chest pain, palpitations, orthopnea, and bilateral lower extremity edema.     - Gastrointestinal: Negative for heartburn, nausea, vomiting, abdominal pain, hematochezia, melena, diarrhea, constipation, and greasy/foul-smelling stools.     - Genitourinary: Negative for dysuria, polyuria, hematuria, pyuria, urinary urgency, and urinary incontinence.    - Musculoskeletal: Chronic pain and mild weakness in left shoulder.    - Skin: Negative for rash, itching, cyanotic skin color change.     - Neurological: Negative for dizziness, tingling, tremors, focal sensory deficit, focal weakness and headaches.     - Endo/Heme/Allergies: Does not bruise/bleed easily.     - Psychiatric/Behavioral: Negative for depression, suicidal/homicidal ideation and memory loss.      All other systems reviewed and are negative    Exam:    /84   Pulse 82   Temp 37.3 °C (99.1 °F) (Temporal)   Resp 12   Ht 1.638 m (5' 4.5\")   Wt 85.3 kg (188 lb)   LMP  (LMP Unknown)   SpO2 97%   BMI 31.77 kg/m²  Body mass index is 31.77 kg/m².    Physical Exam:  Constitutional: Well-developed and well-nourished. Not diaphoretic. No distress.   Skin: Skin is warm and dry. No rash noted.  Head: Atraumatic without lesions.  Eyes: Conjunctivae and extraocular motions are normal. Pupils are equal, round, and reactive to light. No scleral icterus.   Ears:  External ears unremarkable. Tympanic membranes clear and intact.  Nose: Nares patent. Septum midline. Turbinates without erythema nor edema. No discharge.   Mouth/Throat: Dentition is normal. Tongue normal. Oropharynx is clear and moist. Posterior pharynx without erythema or exudates.  Neck: Supple, trachea midline. Normal range of motion. No thyromegaly present. No " lymphadenopathy--cervical or supraclavicular.  Cardiovascular: Regular rate and rhythm, S1 and S2 without murmur, rubs, or gallops.    Chest: Effort normal. Clear to auscultation throughout. No adventitious sounds. No CVA tenderness.  Abdomen: Soft, non tender, and without distention. Active bowel sounds in all four quadrants. No rebound, guarding, masses or HSM.  : Negative for dysuria, polyuria, hematuria, pyuria, urinary urgency, and urinary incontinence.  Extremities: No cyanosis, clubbing, erythema, nor edema. Distal pulses intact and symmetric.   Musculoskeletal: Limited range of motion in left shoulder, unable to left left shoulder above 45 degrees, strength 4-4.  Neurological: Alert and oriented x 3. DTRs 2+/3 and symmetric. No cranial nerve deficit. 5/5 myotomes. Sensation intact. Negative Rhomberg.  Psychiatric:  Behavior, mood, and affect are appropriate.  MA/nursing note and vitals reviewed.    LABS: 2019, 2021  results reviewed and discussed with the patient, questions answered.    My total time spent caring for the patient on the day of the encounter was 25 minutes.   This does not include time spent on separately billable procedures/tests.     Assessment/Plan:  1. Anxiety  Stable on current med regimen, continue.  Refills given  - TSH; Future  - FREE THYROXINE; Future  - propranolol (INDERAL) 20 MG Tab; Take 1 tablet by mouth 2 times a day as needed.  Dispense: 180 tablet; Refill: 3    2. Mixed hyperlipidemia  We will obtain labs and discuss findings with patient.  Continue simvastatin, refills given.  - Lipid Profile; Future  - simvastatin (ZOCOR) 40 MG Tab; Take 1 tablet by mouth every evening.  Dispense: 90 tablet; Refill: 3    3. Chronic obstructive pulmonary disease, unspecified COPD type (HCC)  Stable on current regimen.  Pending pulmonology appointment on 9/14/2021 stable on current regimen.  Continue  - albuterol 108 (90 Base) MCG/ACT Aero Soln inhalation aerosol; Inhale 2 Puffs every 6  hours as needed for Shortness of Breath.  Dispense: 1 Each; Refill: 4  - fluticasone-salmeterol (ADVAIR) 500-50 MCG/DOSE AEROSOL POWDER, BREATH ACTIVATED; Inhale 1 Puff every 12 hours.  Dispense: 60 Each; Refill: 6  - montelukast (SINGULAIR) 10 MG Tab; Take 1 tablet by mouth every day.  Dispense: 90 tablet; Refill: 3    4. Depression, unspecified depression type  Stable on current regimen.  Continue stable on current regimen.  Continue  - FLUoxetine HCl 60 MG Tab; Take 1 tablet by mouth every day.  Dispense: 90 tablet; Refill: 3    5. Gastroesophageal reflux disease without esophagitis  Stable on current regimen.  Continue  - esomeprazole (NEXIUM) 40 MG delayed-release capsule; Take 1 capsule by mouth every morning before breakfast.  Dispense: 90 capsule; Refill: 3    6. Hyperlipidemia, unspecified hyperlipidemia type  As discussed in 2  - simvastatin (ZOCOR) 40 MG Tab; Take 1 tablet by mouth every evening.  Dispense: 90 tablet; Refill: 3    7. Muscle spasm of left shoulder area  Uncontrolled, stable.  Discussed the risks versus benefits of cyclobenzaprine.  Will obtain x-rays, if no definitive findings, will obtain an MRI.  - cyclobenzaprine (FLEXERIL) 10 mg Tab; Take 1 tablet by mouth 2 times a day as needed.  Dispense: 60 tablet; Refill: 5  - DX-SHOULDER 2+ LEFT; Future  - MR-SHOULDER-W/O LEFT; Future    9. Encounter for screening mammogram for malignant neoplasm of breast  - MA-SCREENING MAMMO BILAT W/TOMOSYNTHESIS W/CAD; Future       Discussed with patient possible alternative diagnoses, pt is to take all medications as prescribed. If symptoms persist FU w/PCP, if symptoms worsen go to emergency room. If experiencing any side effects from prescribed medications reports to the office immediately or go to emergency room.  Reviewed indication, dosage, usage and potential adverse effects of prescribed medications. Reviewed risks and benefits of treatment plan. Patient verbalizes understanding of all instruction and  verbally agrees to plan.    No follow-ups on file. annual

## 2021-06-03 NOTE — ASSESSMENT & PLAN NOTE
Chronic problem.  This is well controlled with Nexium 40 mg.  Patient is interested in a brand name of Nexium as generic does not work for her in terms of controlling her symptoms.  She denies epigastric pain, nausea or blood in stool.

## 2021-06-03 NOTE — ASSESSMENT & PLAN NOTE
Chronic problem.  Patient is seeing pulmonology, PRACHI Teran.  She is on albuterol and Advair, needing refills today.  She denies CP, dyspnea, cough.

## 2021-06-04 ENCOUNTER — APPOINTMENT (OUTPATIENT)
Dept: RADIOLOGY | Facility: MEDICAL CENTER | Age: 46
End: 2021-06-04
Attending: NURSE PRACTITIONER
Payer: MEDICARE

## 2021-06-07 ENCOUNTER — APPOINTMENT (OUTPATIENT)
Dept: RADIOLOGY | Facility: MEDICAL CENTER | Age: 46
End: 2021-06-07
Attending: NURSE PRACTITIONER
Payer: MEDICARE

## 2021-06-10 ENCOUNTER — HOSPITAL ENCOUNTER (OUTPATIENT)
Dept: RADIOLOGY | Facility: MEDICAL CENTER | Age: 46
End: 2021-06-10
Attending: NURSE PRACTITIONER
Payer: MEDICARE

## 2021-06-10 ENCOUNTER — HOSPITAL ENCOUNTER (OUTPATIENT)
Dept: LAB | Facility: MEDICAL CENTER | Age: 46
End: 2021-06-10
Attending: NURSE PRACTITIONER
Payer: MEDICARE

## 2021-06-10 DIAGNOSIS — F41.9 ANXIETY: ICD-10-CM

## 2021-06-10 DIAGNOSIS — Z96.612 PRESENCE OF LEFT ARTIFICIAL SHOULDER JOINT: ICD-10-CM

## 2021-06-10 DIAGNOSIS — M62.838 MUSCLE SPASM OF LEFT SHOULDER AREA: ICD-10-CM

## 2021-06-10 DIAGNOSIS — E78.2 MIXED HYPERLIPIDEMIA: ICD-10-CM

## 2021-06-10 PROCEDURE — 84443 ASSAY THYROID STIM HORMONE: CPT

## 2021-06-10 PROCEDURE — 84439 ASSAY OF FREE THYROXINE: CPT

## 2021-06-10 PROCEDURE — 36415 COLL VENOUS BLD VENIPUNCTURE: CPT

## 2021-06-10 PROCEDURE — 73030 X-RAY EXAM OF SHOULDER: CPT | Mod: LT

## 2021-06-10 PROCEDURE — 80061 LIPID PANEL: CPT

## 2021-06-10 PROCEDURE — 73221 MRI JOINT UPR EXTREM W/O DYE: CPT | Mod: LT,ME

## 2021-06-11 LAB
CHOLEST SERPL-MCNC: 263 MG/DL (ref 100–199)
FASTING STATUS PATIENT QL REPORTED: NORMAL
HDLC SERPL-MCNC: 33 MG/DL
LDLC SERPL CALC-MCNC: 197 MG/DL
T4 FREE SERPL-MCNC: 1.05 NG/DL (ref 0.93–1.7)
TRIGL SERPL-MCNC: 164 MG/DL (ref 0–149)
TSH SERPL DL<=0.005 MIU/L-ACNC: 3.58 UIU/ML (ref 0.38–5.33)

## 2021-06-13 ENCOUNTER — HOSPITAL ENCOUNTER (EMERGENCY)
Facility: MEDICAL CENTER | Age: 46
End: 2021-06-13
Attending: EMERGENCY MEDICINE
Payer: MEDICARE

## 2021-06-13 VITALS
OXYGEN SATURATION: 98 % | HEIGHT: 64 IN | WEIGHT: 189.6 LBS | RESPIRATION RATE: 16 BRPM | SYSTOLIC BLOOD PRESSURE: 118 MMHG | DIASTOLIC BLOOD PRESSURE: 82 MMHG | BODY MASS INDEX: 32.37 KG/M2 | TEMPERATURE: 98 F | HEART RATE: 82 BPM

## 2021-06-13 DIAGNOSIS — L02.91 ABSCESS: ICD-10-CM

## 2021-06-13 DIAGNOSIS — R31.9 URINARY TRACT INFECTION WITH HEMATURIA, SITE UNSPECIFIED: ICD-10-CM

## 2021-06-13 DIAGNOSIS — N39.0 URINARY TRACT INFECTION WITH HEMATURIA, SITE UNSPECIFIED: ICD-10-CM

## 2021-06-13 LAB
APPEARANCE UR: ABNORMAL
BACTERIA #/AREA URNS HPF: ABNORMAL /HPF
BILIRUB UR QL STRIP.AUTO: NEGATIVE
COLOR UR: YELLOW
EPI CELLS #/AREA URNS HPF: ABNORMAL /HPF
GLUCOSE UR STRIP.AUTO-MCNC: NEGATIVE MG/DL
HYALINE CASTS #/AREA URNS LPF: ABNORMAL /LPF
KETONES UR STRIP.AUTO-MCNC: NEGATIVE MG/DL
LEUKOCYTE ESTERASE UR QL STRIP.AUTO: ABNORMAL
MICRO URNS: ABNORMAL
NITRITE UR QL STRIP.AUTO: NEGATIVE
PH UR STRIP.AUTO: 7.5 [PH] (ref 5–8)
PROT UR QL STRIP: NEGATIVE MG/DL
RBC # URNS HPF: ABNORMAL /HPF
RBC UR QL AUTO: NEGATIVE
SP GR UR STRIP.AUTO: 1.02
UROBILINOGEN UR STRIP.AUTO-MCNC: 0.2 MG/DL
WBC #/AREA URNS HPF: ABNORMAL /HPF

## 2021-06-13 PROCEDURE — 87205 SMEAR GRAM STAIN: CPT

## 2021-06-13 PROCEDURE — 303977 HCHG I & D

## 2021-06-13 PROCEDURE — 81001 URINALYSIS AUTO W/SCOPE: CPT

## 2021-06-13 PROCEDURE — 99283 EMERGENCY DEPT VISIT LOW MDM: CPT

## 2021-06-13 PROCEDURE — A9270 NON-COVERED ITEM OR SERVICE: HCPCS | Performed by: EMERGENCY MEDICINE

## 2021-06-13 PROCEDURE — 700101 HCHG RX REV CODE 250: Performed by: EMERGENCY MEDICINE

## 2021-06-13 PROCEDURE — 87077 CULTURE AEROBIC IDENTIFY: CPT

## 2021-06-13 PROCEDURE — 700102 HCHG RX REV CODE 250 W/ 637 OVERRIDE(OP): Performed by: EMERGENCY MEDICINE

## 2021-06-13 PROCEDURE — 87070 CULTURE OTHR SPECIMN AEROBIC: CPT

## 2021-06-13 RX ORDER — SULFAMETHOXAZOLE AND TRIMETHOPRIM 800; 160 MG/1; MG/1
1 TABLET ORAL 2 TIMES DAILY
Qty: 14 TABLET | Refills: 0 | Status: SHIPPED | OUTPATIENT
Start: 2021-06-13 | End: 2021-06-20

## 2021-06-13 RX ORDER — BUPIVACAINE HYDROCHLORIDE AND EPINEPHRINE 5; 5 MG/ML; UG/ML
10 INJECTION, SOLUTION EPIDURAL; INTRACAUDAL; PERINEURAL ONCE
Status: COMPLETED | OUTPATIENT
Start: 2021-06-13 | End: 2021-06-13

## 2021-06-13 RX ORDER — SULFAMETHOXAZOLE AND TRIMETHOPRIM 800; 160 MG/1; MG/1
1 TABLET ORAL ONCE
Status: COMPLETED | OUTPATIENT
Start: 2021-06-13 | End: 2021-06-13

## 2021-06-13 RX ADMIN — SULFAMETHOXAZOLE AND TRIMETHOPRIM 1 TABLET: 800; 160 TABLET ORAL at 16:39

## 2021-06-13 RX ADMIN — BUPIVACAINE HYDROCHLORIDE AND EPINEPHRINE 10 ML: 5; 5 INJECTION, SOLUTION EPIDURAL; INTRACAUDAL; PERINEURAL at 16:00

## 2021-06-13 ASSESSMENT — FIBROSIS 4 INDEX: FIB4 SCORE: .6598288790738580166

## 2021-06-13 NOTE — ED TRIAGE NOTES
"..  Chief Complaint   Patient presents with   • Abscess     c/o of absess close to her right labia in her groin x's 1 day.        ./81   Pulse 95   Temp 36.6 °C (97.9 °F) (Temporal)   Resp 16   Ht 1.626 m (5' 4\")   Wt 86 kg (189 lb 9.5 oz)   SpO2 98%        Explained triage process, to waiting room. Asked to inform RN if questions or concerns arise.   "

## 2021-06-13 NOTE — ED PROVIDER NOTES
"ED Provider Note    CHIEF COMPLAINT  Chief Complaint   Patient presents with   • Abscess     c/o of absess close to her right labia in her groin x's 1 day.        HPI  Ynes Beard is a 46 y.o. female who presents for evaluation of an area of redness pain and swelling in the right groin.  The patient thinks that she may have been bit by some sort of insect.  She felt a distinct \"stinging\" around 2 days ago on the right inner thigh.  Over the last day she developed an area of 1 x 1 cm erythema and tenderness.  It is not inside the vagina it is adjacent to the labia.  No fevers or chills.  No other symptoms reported    REVIEW OF SYSTEMS  See HPI for further details.  No high fevers chills night sweats weight loss all other systems are negative.     PAST MEDICAL HISTORY  Past Medical History:   Diagnosis Date   • Fibrocystic breast disease 11/8/2011   • Hx of cervical malignancy 11/8/2011   • COPD (chronic obstructive pulmonary disease) ([facility]) 2009   • Bipolar 1 disorder ([facility]) 2002   • Cancer ([facility]) 2001    br CA   • Anemia    • Anxiety    • Arthritis    • ASTHMA    • Bronchitis    • Fibrocystic breast    • Hemorrhoids    • Hyperlipidemia    • Migraine    • Pneumonia        FAMILY HISTORY  Noncontributory    SOCIAL HISTORY  Social History     Socioeconomic History   • Marital status:      Spouse name: Not on file   • Number of children: Not on file   • Years of education: Not on file   • Highest education level: Not on file   Occupational History   • Occupation:    Tobacco Use   • Smoking status: Current Every Day Smoker     Packs/day: 0.50     Years: 33.00     Pack years: 16.50     Types: Cigarettes     Last attempt to quit: 2/8/2011     Years since quitting: 10.3   • Smokeless tobacco: Never Used   • Tobacco comment: 5 cigs a day    Vaping Use   • Vaping Use: Former   Substance and Sexual Activity   • Alcohol use: Yes     Comment: occasional   • Drug use: No   • Sexual activity: Yes     Partners: Male "   Other Topics Concern   • Not on file   Social History Narrative   • Not on file     Social Determinants of Health     Financial Resource Strain:    • Difficulty of Paying Living Expenses:    Food Insecurity:    • Worried About Running Out of Food in the Last Year:    • Ran Out of Food in the Last Year:    Transportation Needs:    • Lack of Transportation (Medical):    • Lack of Transportation (Non-Medical):    Physical Activity:    • Days of Exercise per Week:    • Minutes of Exercise per Session:    Stress:    • Feeling of Stress :    Social Connections:    • Frequency of Communication with Friends and Family:    • Frequency of Social Gatherings with Friends and Family:    • Attends Bahai Services:    • Active Member of Clubs or Organizations:    • Attends Club or Organization Meetings:    • Marital Status:    Intimate Partner Violence:    • Fear of Current or Ex-Partner:    • Emotionally Abused:    • Physically Abused:    • Sexually Abused:        SURGICAL HISTORY  Past Surgical History:   Procedure Laterality Date   • BREAST IMPLANT REVISION  1/19/2012    Performed by ADAM CHOWDHURY at [facility] ORS   • CAPSULECTOMY  11/22/2011    Performed by ADAM CHOWDHURY at [facility] ORS   • BREAST IMPLANT REVISION  11/22/2011    Performed by ADAM CHOWDHURY at [facility] ORS   • BREAST IMPLANT REMOVAL  2/13/2009    Performed by ANASTASIA WILKERSON at [facility]   • BREAST IMPLANT REVISION  2/13/2009    Performed by ANASTASIA WILKERSON at [facility] ORS   • CAPSULECTOMY  2/13/2009    Performed by ANASTASIA WILKERSON at [facility]   • HYSTERECTOMY, TOTAL ABDOMINAL  2004   • MASTECTOMY  2001    with reduction   • ABDOMINAL HYSTERECTOMY TOTAL     • APPENDECTOMY     • CHOLECYSTECTOMY         CURRENT MEDICATIONS  Home Medications     Reviewed by Saige Rousseau R.N. (Registered Nurse) on 06/13/21 at 1427  Med List Status: Not Addressed   Medication  "Last Dose Status   albuterol 108 (90 Base) MCG/ACT Aero Soln inhalation aerosol  Active   Alcohol Swabs  Active   Blood Glucose Meter Kit  Active   Blood Glucose Monitoring Suppl (FREESTYLE LITE) Device  Active   Blood Glucose Test Strips  Active   cyclobenzaprine (FLEXERIL) 10 mg Tab  Active   esomeprazole (NEXIUM) 40 MG delayed-release capsule  Active   FLUoxetine HCl 60 MG Tab  Active   fluticasone-salmeterol (ADVAIR) 500-50 MCG/DOSE AEROSOL POWDER, BREATH ACTIVATED  Active   FREESTYLE LITE strip  Active   furosemide (LASIX) 20 MG Tab  Active   Lancets  Active   montelukast (SINGULAIR) 10 MG Tab  Active   propranolol (INDERAL) 20 MG Tab  Active   simvastatin (ZOCOR) 40 MG Tab  Active   Umeclidinium Bromide (INCRUSE ELLIPTA) 62.5 MCG/INH AEROSOL POWDER, BREATH ACTIVATED  Active   vitamin D (CHOLECALCIFEROL) 1000 UNIT Tab  Active                ALLERGIES  No Known Allergies    PHYSICAL EXAM  VITAL SIGNS: /81   Pulse 95   Temp 36.6 °C (97.9 °F) (Temporal)   Resp 16   Ht 1.626 m (5' 4\")   Wt 86 kg (189 lb 9.5 oz)   LMP  (LMP Unknown)   SpO2 98%   BMI 32.54 kg/m²       Constitutional: Well developed, Well nourished, No acute distress, Non-toxic appearance.   HENT: Normocephalic, Atraumatic, Bilateral external ears normal, Oropharynx moist, No oral exudates, Nose normal.   Eyes: PERRLA, EOMI, Conjunctiva normal, No discharge.   Neck: Normal range of motion, No tenderness, Supple, No stridor.    Cardiovascular: Normal heart rate, Normal rhythm, No murmurs, No rubs, No gallops.   Thorax & Lungs: Normal breath sounds, No respiratory distress, No wheezing, No chest tenderness.   Abdomen: Bowel sounds normal, Soft, No tenderness, No masses, No pulsatile masses.   Skin: Right groin has a 1 x 1 cm of erythema and fluctuance consistent with a small localized abscess with minimal cellulitis.  It does not involve the perineal tissues such as the labia or perianal region.  Back: No tenderness, No CVA tenderness. "   Extremities: Intact distal pulses, No edema, No tenderness, No cyanosis, No clubbing.   Musculoskeletal: Good range of motion in all major joints. No tenderness to palpation or major deformities noted.   Neurologic: Alert & oriented x 3, Normal motor function, Normal sensory function, No focal deficits noted.   Psychiatric: Anxious      COURSE & MEDICAL DECISION MAKING  Pertinent Labs & Imaging studies reviewed. (See chart for details)  Results for orders placed or performed during the hospital encounter of 06/13/21   URINALYSIS    Specimen: Urine   Result Value Ref Range    Color Yellow     Character Cloudy (A)     Specific Gravity 1.017 <1.035    Ph 7.5 5.0 - 8.0    Glucose Negative Negative mg/dL    Ketones Negative Negative mg/dL    Protein Negative Negative mg/dL    Bilirubin Negative Negative    Urobilinogen, Urine 0.2 Negative    Nitrite Negative Negative    Leukocyte Esterase Trace (A) Negative    Occult Blood Negative Negative    Micro Urine Req Microscopic    URINE MICROSCOPIC (W/UA)   Result Value Ref Range    WBC 5-10 (A) /hpf    RBC 0-2 /hpf    Bacteria Many (A) None /hpf    Epithelial Cells Moderate (A) /hpf    Hyaline Cast 0-2 /lpf       Physician procedure incision and drainage of cutaneous abscess on the right thigh.  The wound was prepped with Betadine x3.  A total of 5 cc of bupivacaine was infiltrated into the skin and deeper tissues.  An 11 blade scalpel was used to make a 7 mm incision over the point of maximal fluctuance.  Approximately 1 cc of pus was evacuated and loculations were broken up with a blunt instrument.  Wound culture was obtained and packing gauze was placed into the wound.  Blood loss less than 1 cc    Patient does not appear clinically toxic.  She also has likely some mild urinary tract infection.  I will start the patient on Bactrim as it will likely cover her mild UTI as well as mild cellulitis and abscess.  Wound check and sitz bath's have been recommended    FINAL  IMPRESSION  1.  Groin abscess  2.  Urinary tract infection         Electronically signed by: Hayden Rizzo M.D., 6/13/2021 3:28 PM

## 2021-06-14 LAB
GRAM STN SPEC: NORMAL
SIGNIFICANT IND 70042: NORMAL
SITE SITE: NORMAL
SOURCE SOURCE: NORMAL

## 2021-06-15 LAB
BACTERIA WND AEROBE CULT: ABNORMAL
BACTERIA WND AEROBE CULT: ABNORMAL
GRAM STN SPEC: ABNORMAL
SIGNIFICANT IND 70042: ABNORMAL
SITE SITE: ABNORMAL
SOURCE SOURCE: ABNORMAL

## 2021-06-15 NOTE — ED NOTES
"ED Positive Culture Follow-up/Notification Note:    Date: 6/15/21     Patient seen in the ED on 6/13/2021 for evaluation of an abscess adjacent to the labia. Negative for fever/chills. Pt underwent I/D of abscess with approximately 1 mL of pus evacuated and loculations were broken up. UA was positive for many bacteria, no suprapubic tenderness or dysuria.     1. Abscess    2. Urinary tract infection with hematuria, site unspecified       Discharge Medication List as of 6/13/2021  4:43 PM      START taking these medications    Details   sulfamethoxazole-trimethoprim (BACTRIM DS) 800-160 MG tablet Take 1 tablet by mouth 2 times a day for 7 days., Disp-14 tablet, R-0, Normal             Allergies: Patient has no known allergies.     Vitals:    06/13/21 1423 06/13/21 1426 06/13/21 1641   BP: 136/81  118/82   Pulse: 95  82   Resp: 16  16   Temp: 36.6 °C (97.9 °F)  36.7 °C (98 °F)   TempSrc: Temporal  Temporal   SpO2: 98%  98%   Weight:  86 kg (189 lb 9.5 oz)    Height:  1.626 m (5' 4\")        Final cultures:   Results     Procedure Component Value Units Date/Time    CULTURE WOUND W/ GRAM STAIN [[id number]]  (Abnormal) Collected: 06/13/21 1640    Order Status: Completed Specimen: Wound from Abscess Updated: 06/15/21 1224     Significant Indicator POS     Source WND     Site Right Groin     Culture Result Light growth usual skin jailene.  Moderate growth mixed anaerobes, none predominant.       Gram Stain Result Many WBCs.  Moderate Gram positive rods.  Moderate Gram positive cocci.       Culture Result -    Narrative:      Right groin  Right groin    GRAM STAIN [[id number]] Collected: 06/13/21 1640    Order Status: Completed Specimen: Wound Updated: 06/14/21 0649     Significant Indicator .     Source WND     Site Right Groin     Gram Stain Result Many WBCs.  Moderate Gram positive rods.  Moderate Gram positive cocci.      Narrative:      Right groin  Right groin    URINALYSIS [[id number]]  (Abnormal) Collected: 06/13/21 1534 "    Order Status: Completed Specimen: Urine Updated: 06/13/21 1602     Color Yellow     Character Cloudy     Specific Gravity 1.017     Ph 7.5     Glucose Negative mg/dL      Ketones Negative mg/dL      Protein Negative mg/dL      Bilirubin Negative     Urobilinogen, Urine 0.2     Nitrite Negative     Leukocyte Esterase Trace     Occult Blood Negative     Micro Urine Req Microscopic          Plan:     Called micro to discuss culture result and was informed that there was no clostridium or bacteroides species in the culture, but that there were mixed gram positive anaerobes that were unidentifiable. With no predominant species identified, bactrim represents a good empiric antibiotic choice. No changes required.    Ki Krishnan, PharmD

## 2021-06-18 ENCOUNTER — TELEMEDICINE (OUTPATIENT)
Dept: MEDICAL GROUP | Facility: PHYSICIAN GROUP | Age: 46
End: 2021-06-18
Payer: MEDICARE

## 2021-06-18 VITALS — WEIGHT: 188 LBS | HEIGHT: 65 IN | BODY MASS INDEX: 31.32 KG/M2

## 2021-06-18 DIAGNOSIS — F41.9 ANXIETY AND DEPRESSION: ICD-10-CM

## 2021-06-18 DIAGNOSIS — F32.A ANXIETY AND DEPRESSION: ICD-10-CM

## 2021-06-18 DIAGNOSIS — F31.60 BIPOLAR AFFECTIVE DISORDER, CURRENT EPISODE MIXED, CURRENT EPISODE SEVERITY UNSPECIFIED (HCC): ICD-10-CM

## 2021-06-18 DIAGNOSIS — Z02.9 ADMINISTRATIVE ENCOUNTER: ICD-10-CM

## 2021-06-18 DIAGNOSIS — E78.5 HYPERLIPIDEMIA, UNSPECIFIED HYPERLIPIDEMIA TYPE: ICD-10-CM

## 2021-06-18 DIAGNOSIS — E78.2 MIXED HYPERLIPIDEMIA: ICD-10-CM

## 2021-06-18 DIAGNOSIS — Z02.89 ENCOUNTER FOR PHYSICAL EXAMINATION OF PROSPECTIVE FOSTER PARENT: ICD-10-CM

## 2021-06-18 PROCEDURE — 99213 OFFICE O/P EST LOW 20 MIN: CPT | Mod: 95 | Performed by: NURSE PRACTITIONER

## 2021-06-18 ASSESSMENT — PATIENT HEALTH QUESTIONNAIRE - PHQ9: CLINICAL INTERPRETATION OF PHQ2 SCORE: 0

## 2021-06-18 ASSESSMENT — ANXIETY QUESTIONNAIRES
3. WORRYING TOO MUCH ABOUT DIFFERENT THINGS: NOT AT ALL
2. NOT BEING ABLE TO STOP OR CONTROL WORRYING: NOT AT ALL
5. BEING SO RESTLESS THAT IT IS HARD TO SIT STILL: NOT AT ALL
6. BECOMING EASILY ANNOYED OR IRRITABLE: NOT AT ALL
4. TROUBLE RELAXING: NOT AT ALL
7. FEELING AFRAID AS IF SOMETHING AWFUL MIGHT HAPPEN: NOT AT ALL
GAD7 TOTAL SCORE: 0
1. FEELING NERVOUS, ANXIOUS, OR ON EDGE: NOT AT ALL

## 2021-06-18 ASSESSMENT — FIBROSIS 4 INDEX: FIB4 SCORE: .6598288790738580166

## 2021-06-18 NOTE — ASSESSMENT & PLAN NOTE
Chronic problem.  On fluoxetine 60 mg daily.  Patient denies manic depressive episodes.  She is not on antipsychotic medications.  PHQ-9 and JASON-7 screening is 0 today.  Patient denies SI HI.

## 2021-06-18 NOTE — PROGRESS NOTES
Telemedicine Visit: Established Patient     This encounter was conducted via Zoom.   Verbal consent was obtained. Patient's identity was verified.    Subjective:     Chief Complaint   Patient presents with   • Paperwork     Justyn Montes is a 46 y.o. female presenting for evaluation and management of following problems:    Hyperlipidemia  Results for JUSTYN MONTES (MRN [id number]) as of 6/18/2021 12:12   Ref. Range 6/10/2021 07:48   Cholesterol,Tot Latest Ref Range: 100 - 199 mg/dL 263 (H)   Triglycerides Latest Ref Range: 0 - 149 mg/dL 164 (H)   HDL Latest Ref Range: >=40 mg/dL 33 (A)   LDL Latest Ref Range: <100 mg/dL 197 (H)   On simvastatin 40 mg, tolerating well.  Patient was out of her medication for a few months as she ran out.  She started taking her medications now.  She denies CP, dyspnea, dizziness or peripheral edema.    Anxiety and depression  Chronic problem.  This is well controlled on fluoxetine 60 mg daily.  Patient scored 0 on PHQ-9 and TIRSO-7, denies SI,HI.  She has stopped propranolol.    Bipolar disorder  Chronic problem.  On fluoxetine 60 mg daily.  Patient denies manic depressive episodes.  She is not on antipsychotic medications.  PHQ-9 and TIRSO-7 screening is 0 today.  Patient denies SI HI.    Encounter for physical examination of prospective   Patient presenting with paperwork for the patient of health and human services, division of child and family services.  Patient is currently fostering 2 of her grandchildren.  Patient physical assessment is complete, lab work complete.  Pending evaluation by psychology due to history of bipolar, anxiety depression.  Patient is on fluoxetine 60 mg well controlled.  Her PHQ 9 and tirso 7 is 0 today. Denies SI, HI.      ROS   Denies any recent fevers or chills. No nausea or vomiting. No chest pains or shortness of breath.     No Known Allergies    Current medicines (including changes today)  Current Outpatient Medications   Medication Sig  Dispense Refill   • sulfamethoxazole-trimethoprim (BACTRIM DS) 800-160 MG tablet Take 1 tablet by mouth 2 times a day for 7 days. 14 tablet 0   • albuterol 108 (90 Base) MCG/ACT Aero Soln inhalation aerosol Inhale 2 Puffs every 6 hours as needed for Shortness of Breath. 1 Each 4   • fluticasone-salmeterol (ADVAIR) 500-50 MCG/DOSE AEROSOL POWDER, BREATH ACTIVATED Inhale 1 Puff every 12 hours. 60 Each 6   • FLUoxetine HCl 60 MG Tab Take 1 tablet by mouth every day. 90 tablet 3   • esomeprazole (NEXIUM) 40 MG delayed-release capsule Take 1 capsule by mouth every morning before breakfast. 90 capsule 3   • montelukast (SINGULAIR) 10 MG Tab Take 1 tablet by mouth every day. 90 tablet 3   • simvastatin (ZOCOR) 40 MG Tab Take 1 tablet by mouth every evening. 90 tablet 3   • cyclobenzaprine (FLEXERIL) 10 mg Tab Take 1 tablet by mouth 2 times a day as needed. 60 tablet 5   • Umeclidinium Bromide (INCRUSE ELLIPTA) 62.5 MCG/INH AEROSOL POWDER, BREATH ACTIVATED Inhale 1 Puff every day. 1 Each 0   • Lancets Use one Freestyle Roxie lancet to test blood sugar twice daily. 100 Each 0   • Alcohol Swabs Wipe site with prep pad prior to injection. 100 Each 0   • FREESTYLE LITE strip USE 1 STRIP TO CHECK BLOOD SUGAR TWICE DAILY  0   • Blood Glucose Monitoring Suppl (FREESTYLE LITE) Device USE AS DIRECTED TO TEST BLOOD SUGAR  0   • vitamin D (CHOLECALCIFEROL) 1000 UNIT Tab Take 2,000 Units by mouth every day.       No current facility-administered medications for this visit.       Patient Active Problem List    Diagnosis Date Noted   • Encounter for physical examination of prospective  06/18/2021   • Encounter for smoking cessation counseling 07/18/2019   • Elevated hemoglobin A1c 07/09/2019   • Prediabetes 07/03/2019   • Pelvic pain 06/17/2019   • Encounter to establish care 06/11/2019   • Family history of diabetes mellitus 06/11/2019   • Vitamin D deficiency 06/11/2019   • Hyperlipidemia 06/11/2019   • History of  "endometriosis 06/11/2019   • Anxiety and depression 06/11/2019   • Muscle spasm of left shoulder area 06/11/2019   • Water retention 06/11/2019   • GERD (gastroesophageal reflux disease) 06/11/2019   • Depression 06/11/2019   • SOB (shortness of breath) 06/11/2019   • Left breast lump 06/11/2019   • Bipolar disorder (Formerly [facility]) 01/07/2013   • COPD (chronic obstructive pulmonary disease) (Formerly [facility]) 11/08/2011   • Fibrocystic breast disease 11/08/2011   • Hx of cervical malignancy 11/08/2011   • Dyspepsia 11/08/2011       Family History   Problem Relation Age of Onset   • Lung Disease Mother    • Cancer Mother         cervical   • Heart Disease Mother    • Hypertension Mother    • Other Mother         migraines   • Lung Disease Father    • Asthma Father    • Psychiatric Illness Father    • Cancer Sister         lung   • Cancer Maternal Grandmother 70        throat   • Hypertension Other    • Cancer Other    • Lung Disease Other    • Heart Disease Maternal Uncle    • Lung Disease Maternal Uncle    • Anemia Daughter        She  has a past medical history of Anemia, Anxiety, Arthritis, ASTHMA, Bipolar 1 disorder (Formerly [facility]) (2002), Bronchitis, Cancer (Formerly [facility]) (2001), COPD (chronic obstructive pulmonary disease) (Formerly [facility]) (2009), Fibrocystic breast, Fibrocystic breast disease (11/8/2011), Hemorrhoids, cervical malignancy (11/8/2011), Hyperlipidemia, Migraine, and Pneumonia.  She  has a past surgical history that includes mastectomy (2001); hysterectomy, total abdominal (2004); breast implant removal (2/13/2009); breast implant revision (2/13/2009); capsulectomy (2/13/2009); capsulectomy (11/22/2011); breast implant revision (11/22/2011); breast implant revision (1/19/2012); cholecystectomy; appendectomy; and abdominal hysterectomy total (2004).       Objective:   Vitals obtained by patient:  Ht 1.638 m (5' 4.5\")   Wt 85.3 kg (188 lb)   LMP  (LMP Unknown)   BMI 31.77 kg/m²      Physical Exam:  General: No acute distress. Well nourished. "   HEENT: EOM grossly intact, no scleral icterus, no pharyngeal erythema.   Neck:  No JVD noted at 90 degrees, trachea midline  CVS: Pulse as reported by patient, no visible LE edema.  Resp: Unlabored respiratory effort, no cough or audible wheeze  MSK/Ext: No clubbing or cyanosis visible appreciated.  Skin: No rashes in visible areas.  Neurological: AOx3. CN III-XII grossly intact. No focal deficits.     LABS: 2021  results reviewed and discussed with the patient, questions answered.    My total time spent caring for the patient on the day of the encounter was 15 minutes.   This does not include time spent on separately billable procedures/tests.   Assessment and Plan:   1. Mixed hyperlipidemia  Uncontrolled, stable.  Could be due to not taking medication for a few months.  Patient restarted statins, continue.  Will recheck labs in 3 months.  - Lipid Profile; Future    2. Hyperlipidemia, unspecified hyperlipidemia type  As described in 1    3. Anxiety and depression  Stable on current regimen.  Will need an assessment /evaluation by specialist for paperwork.  - REFERRAL TO PSYCHOLOGY    4. Bipolar affective disorder, current episode mixed, current episode severity unspecified (HCC)  Stable.  Need psychology evaluation completed for paperwork for division of child and family services.  - REFERRAL TO PSYCHOLOGY    5. Administrative encounter  Paperwork filled out, scanned into the system and will be mailed to patient.  - REFERRAL TO PSYCHOLOGY    6. Encounter for physical examination of prospective   As described above  - REFERRAL TO PSYCHOLOGY       Follow-up: No follow-ups on file.

## 2021-06-18 NOTE — ASSESSMENT & PLAN NOTE
Results for JUSTYN MONTES (MRN [id number]) as of 6/18/2021 12:12   Ref. Range 6/10/2021 07:48   Cholesterol,Tot Latest Ref Range: 100 - 199 mg/dL 263 (H)   Triglycerides Latest Ref Range: 0 - 149 mg/dL 164 (H)   HDL Latest Ref Range: >=40 mg/dL 33 (A)   LDL Latest Ref Range: <100 mg/dL 197 (H)   On simvastatin 40 mg, tolerating well.  Patient was out of her medication for a few months as she ran out.  She started taking her medications now.  She denies CP, dyspnea, dizziness or peripheral edema.

## 2021-06-18 NOTE — ASSESSMENT & PLAN NOTE
Chronic problem.  This is well controlled on fluoxetine 60 mg daily.  Patient scored 0 on PHQ-9 and JASON-7, denies SI,HI.  She has stopped propranolol.

## 2021-06-18 NOTE — ASSESSMENT & PLAN NOTE
Patient presenting with paperwork for the patient of health and human [facility], division of child and family services.  Patient is currently fostering 2 of her grandchildren.  Patient physical assessment is complete, lab work complete.  Pending evaluation by psychology due to history of bipolar, anxiety depression.  Patient is on fluoxetine 60 mg well controlled.  Her PHQ 9 and tirso 7 is 0 today. Denies SI, HI.

## 2021-09-14 ENCOUNTER — SLEEP CENTER VISIT (OUTPATIENT)
Dept: SLEEP MEDICINE | Facility: MEDICAL CENTER | Age: 46
End: 2021-09-14
Payer: MEDICARE

## 2021-09-14 ENCOUNTER — NON-PROVIDER VISIT (OUTPATIENT)
Dept: SLEEP MEDICINE | Facility: MEDICAL CENTER | Age: 46
End: 2021-09-14
Attending: INTERNAL MEDICINE
Payer: MEDICARE

## 2021-09-14 VITALS
WEIGHT: 181 LBS | RESPIRATION RATE: 16 BRPM | HEIGHT: 64 IN | OXYGEN SATURATION: 98 % | BODY MASS INDEX: 30.9 KG/M2 | HEART RATE: 78 BPM | TEMPERATURE: 97.7 F | SYSTOLIC BLOOD PRESSURE: 122 MMHG | DIASTOLIC BLOOD PRESSURE: 78 MMHG

## 2021-09-14 VITALS — BODY MASS INDEX: 30.9 KG/M2 | HEIGHT: 64 IN | WEIGHT: 181 LBS

## 2021-09-14 DIAGNOSIS — J44.9 CHRONIC OBSTRUCTIVE PULMONARY DISEASE, UNSPECIFIED COPD TYPE (HCC): ICD-10-CM

## 2021-09-14 DIAGNOSIS — R91.8 PULMONARY NODULES: ICD-10-CM

## 2021-09-14 DIAGNOSIS — Z72.0 TOBACCO USE: ICD-10-CM

## 2021-09-14 DIAGNOSIS — J45.40 MODERATE PERSISTENT ASTHMA WITHOUT COMPLICATION: ICD-10-CM

## 2021-09-14 DIAGNOSIS — G47.34 NOCTURNAL HYPOXIA: ICD-10-CM

## 2021-09-14 PROCEDURE — 94761 N-INVAS EAR/PLS OXIMETRY MLT: CPT | Performed by: INTERNAL MEDICINE

## 2021-09-14 PROCEDURE — 99214 OFFICE O/P EST MOD 30 MIN: CPT | Mod: 25 | Performed by: INTERNAL MEDICINE

## 2021-09-14 PROCEDURE — 94726 PLETHYSMOGRAPHY LUNG VOLUMES: CPT | Performed by: INTERNAL MEDICINE

## 2021-09-14 PROCEDURE — 94060 EVALUATION OF WHEEZING: CPT | Performed by: INTERNAL MEDICINE

## 2021-09-14 PROCEDURE — 94729 DIFFUSING CAPACITY: CPT | Performed by: INTERNAL MEDICINE

## 2021-09-14 ASSESSMENT — ENCOUNTER SYMPTOMS
EYE DISCHARGE: 0
TREMORS: 0
ORTHOPNEA: 0
CLAUDICATION: 0
HEADACHES: 0
EYE PAIN: 0
DIZZINESS: 0
MYALGIAS: 0
DIARRHEA: 0
SORE THROAT: 0
EYE REDNESS: 0
BACK PAIN: 0
SHORTNESS OF BREATH: 1
PHOTOPHOBIA: 0
HEMOPTYSIS: 0
FALLS: 0
PND: 0
SPEECH CHANGE: 0
FEVER: 0
FOCAL WEAKNESS: 0
NAUSEA: 0
WEIGHT LOSS: 0
PALPITATIONS: 0
HEARTBURN: 0
DEPRESSION: 0
STRIDOR: 0
WEAKNESS: 0
CONSTIPATION: 0
DIAPHORESIS: 0
CHILLS: 0
ABDOMINAL PAIN: 0
NECK PAIN: 0
DOUBLE VISION: 0
BLURRED VISION: 0
WHEEZING: 1
VOMITING: 0
SPUTUM PRODUCTION: 0
COUGH: 0
SINUS PAIN: 0

## 2021-09-14 ASSESSMENT — PULMONARY FUNCTION TESTS
FEV1: 1.7
FVC_PREDICTED: 3.5
FEV1/FVC_PERCENT_PREDICTED: 76
FEV1/FVC_PERCENT_PREDICTED: 77
FEV1_PREDICTED: 2.82
FEV1/FVC_PREDICTED: 81
FEV1/FVC_PERCENT_PREDICTED: 73
FEV1_PERCENT_CHANGE: 0
FEV1_LLN: 2.35
FVC: 2.76
FEV1/FVC_PERCENT_PREDICTED: 81
FEV1/FVC: 59
FVC: 2.79
FEV1/FVC: 62
FVC_LLN: 2.93
FEV1/FVC: 62
FEV1/FVC_PERCENT_LLN: 67
FEV1_PERCENT_CHANGE: -2
FEV1/FVC_PERCENT_CHANGE: -3
FVC_PERCENT_PREDICTED: 78
FVC_PERCENT_PREDICTED: 79
FEV1_PERCENT_PREDICTED: 60
FEV1/FVC: 59.14
FEV1: 1.65
FEV1/FVC_PERCENT_PREDICTED: 73
FEV1_PERCENT_PREDICTED: 58

## 2021-09-14 ASSESSMENT — FIBROSIS 4 INDEX
FIB4 SCORE: .6598288790738580166
FIB4 SCORE: .6598288790738580166

## 2021-09-14 NOTE — PROGRESS NOTES
Chief Complaint   Patient presents with   • Follow-Up     last seen 6/24/19    • Results     PFT 9/14/21          HPI: This patient is a 46 y.o. female whom is followed in our clinic for COPD last seen by me on 6/24/19.  The patient's past medical history is significant for a diagnosis of COPD, anxiety, dyslipidemia, bipolar disorder/major depressive disorder, breast cancer status post bilateral mastectomy not requiring chemo or radiation therapy.  The patient has a 33-pack-year tobacco history and continues to smoke <1/4 ppd. Pt was seen by [facility] in the past an on O2 briefly during the day but no desaturation on ambulation last visit in 2019 or today. She was using O2 at night and OPO on 2LPM showed adequate oxygenation previously but she has since changed insurance and lost her O2. She has PFTs from today which show airflow obstruction with FEV1 1.4 L or 60% predicted, ratio of 62, air trapping with normal total lung and low normal DLCO at 80% predicted.  These are not significantly changed from 2019 other than DLCO which is mildly reduced from 84% predicted.  She is on Advair 500, Incruse and montelukast.  She also uses Ventolin and has nebulized bronchodilators.  During the day, she typically does not need her rescue therapy more than once per week however she has been experiencing waking from sleep due to dyspnea and a sensation that she cannot breathe in.  This resolves with treatment with bronchodilators but she is fearful of going back to sleep.  Of note, the patient has reflux and is on proton pump inhibitor.  She denies ongoing symptoms of reflux and saw GI in the past year with normal EGD.  She does snore but no evidence of periodic desaturation on overnight oximetry done in 2019.  No witnessed apnea.  Patient reported a history of pulmonary nodule seen in the past although I had no CT imaging at the time of our last visit.  No recent chest imaging.    Past Medical History:   Diagnosis Date   • Anemia    •  Anxiety    • Arthritis    • ASTHMA    • Bipolar 1 disorder ([facility]) 2002   • Bronchitis    • Cancer ([facility]) 2001    br CA   • COPD (chronic obstructive pulmonary disease) ([facility]) 2009   • Fibrocystic breast    • Fibrocystic breast disease 11/8/2011   • Hemorrhoids    • Hx of cervical malignancy 11/8/2011   • Hyperlipidemia    • Migraine    • Pneumonia        Social History     Socioeconomic History   • Marital status:      Spouse name: Not on file   • Number of children: Not on file   • Years of education: Not on file   • Highest education level: Not on file   Occupational History   • Occupation:    Tobacco Use   • Smoking status: Current Every Day Smoker     Packs/day: 0.50     Years: 33.00     Pack years: 16.50     Types: Cigarettes     Last attempt to quit: 2/8/2011     Years since quitting: 10.6   • Smokeless tobacco: Never Used   • Tobacco comment: 1 cigs a day- 9/14/21    Vaping Use   • Vaping Use: Former   Substance and Sexual Activity   • Alcohol use: Yes     Comment: occasional   • Drug use: No   • Sexual activity: Yes     Partners: Male   Other Topics Concern   • Not on file   Social History Narrative   • Not on file     Social Determinants of Health     Financial Resource Strain:    • Difficulty of Paying Living Expenses:    Food Insecurity:    • Worried About Running Out of Food in the Last Year:    • Ran Out of Food in the Last Year:    Transportation Needs:    • Lack of Transportation (Medical):    • Lack of Transportation (Non-Medical):    Physical Activity:    • Days of Exercise per Week:    • Minutes of Exercise per Session:    Stress:    • Feeling of Stress :    Social Connections:    • Frequency of Communication with Friends and Family:    • Frequency of Social Gatherings with Friends and Family:    • Attends [employer] Services:    • Active Member of Clubs or Organizations:    • Attends Club or Organization Meetings:    • Marital Status:    Intimate Partner Violence:    • Fear of Current or  Ex-Partner:    • Emotionally Abused:    • Physically Abused:    • Sexually Abused:        Family History   Problem Relation Age of Onset   • Lung Disease Mother    • Cancer Mother         cervical   • Heart Disease Mother    • Hypertension Mother    • Other Mother         migraines   • Lung Disease Father    • Asthma Father    • Psychiatric Illness Father    • Cancer Sister         lung   • Cancer Maternal Grandmother 70        throat   • Hypertension Other    • Cancer Other    • Lung Disease Other    • Heart Disease Maternal Uncle    • Lung Disease Maternal Uncle    • Anemia Daughter        Current Outpatient Medications on File Prior to Visit   Medication Sig Dispense Refill   • albuterol 108 (90 Base) MCG/ACT Aero Soln inhalation aerosol Inhale 2 Puffs every 6 hours as needed for Shortness of Breath. 1 Each 4   • fluticasone-salmeterol (ADVAIR) 500-50 MCG/DOSE AEROSOL POWDER, BREATH ACTIVATED Inhale 1 Puff every 12 hours. 60 Each 6   • FLUoxetine HCl 60 MG Tab Take 1 tablet by mouth every day. 90 tablet 3   • esomeprazole (NEXIUM) 40 MG delayed-release capsule Take 1 capsule by mouth every morning before breakfast. 90 capsule 3   • montelukast (SINGULAIR) 10 MG Tab Take 1 tablet by mouth every day. 90 tablet 3   • simvastatin (ZOCOR) 40 MG Tab Take 1 tablet by mouth every evening. 90 tablet 3   • cyclobenzaprine (FLEXERIL) 10 mg Tab Take 1 tablet by mouth 2 times a day as needed. 60 tablet 5   • Umeclidinium Bromide (INCRUSE ELLIPTA) 62.5 MCG/INH AEROSOL POWDER, BREATH ACTIVATED Inhale 1 Puff every day. 1 Each 0   • vitamin D (CHOLECALCIFEROL) 1000 UNIT Tab Take 5,000 Units by mouth every day.     • Lancets Use one Freestyle Roxie lancet to test blood sugar twice daily. 100 Each 0   • Alcohol Swabs Wipe site with prep pad prior to injection. 100 Each 0   • FREESTYLE LITE strip USE 1 STRIP TO CHECK BLOOD SUGAR TWICE DAILY  0   • Blood Glucose Monitoring Suppl (FREESTYLE LITE) Device USE AS DIRECTED TO TEST BLOOD  "SUGAR  0     No current facility-administered medications on file prior to visit.       Patient has no known allergies.      ROS:   Review of Systems   Constitutional: Negative for chills, diaphoresis, fever, malaise/fatigue and weight loss.   HENT: Negative for congestion, ear discharge, ear pain, hearing loss, nosebleeds, sinus pain, sore throat and tinnitus.    Eyes: Negative for blurred vision, double vision, photophobia, pain, discharge and redness.   Respiratory: Positive for shortness of breath and wheezing. Negative for cough, hemoptysis, sputum production and stridor.    Cardiovascular: Negative for chest pain, palpitations, orthopnea, claudication, leg swelling and PND.   Gastrointestinal: Negative for abdominal pain, constipation, diarrhea, heartburn, nausea and vomiting.   Genitourinary: Negative for dysuria and urgency.   Musculoskeletal: Negative for back pain, falls, joint pain, myalgias and neck pain.   Skin: Negative for itching and rash.   Neurological: Negative for dizziness, tremors, speech change, focal weakness, weakness and headaches.   Endo/Heme/Allergies: Negative for environmental allergies.   Psychiatric/Behavioral: Negative for depression.       /78 (BP Location: Right arm, Patient Position: Sitting, BP Cuff Size: Adult)   Pulse 78   Temp 36.5 °C (97.7 °F) (Temporal)   Resp 16   Ht 1.626 m (5' 4\")   Wt 82.1 kg (181 lb)   SpO2 98%   Physical Exam  Vitals reviewed.   Constitutional:       General: She is not in acute distress.     Appearance: Normal appearance. She is well-developed and normal weight.   HENT:      Head: Normocephalic and atraumatic.      Right Ear: External ear normal.      Left Ear: External ear normal.      Nose: Nose normal. No congestion.      Mouth/Throat:      Mouth: Mucous membranes are moist.      Pharynx: Oropharynx is clear. No oropharyngeal exudate.   Eyes:      General: No scleral icterus.     Extraocular Movements: Extraocular movements intact.      " Conjunctiva/sclera: Conjunctivae normal.      Pupils: Pupils are equal, round, and reactive to light.   Neck:      Vascular: No JVD.      Trachea: No tracheal deviation.   Cardiovascular:      Rate and Rhythm: Normal rate and regular rhythm.      Heart sounds: Normal heart sounds. No murmur heard.   No friction rub. No gallop.    Pulmonary:      Effort: Pulmonary effort is normal. No accessory muscle usage or respiratory distress.      Breath sounds: Wheezing present. No rales.      Comments: Diffuse expiratory wheezes b/l  Abdominal:      General: There is no distension.      Palpations: Abdomen is soft.      Tenderness: There is no abdominal tenderness.   Musculoskeletal:         General: No tenderness or deformity. Normal range of motion.      Cervical back: Normal range of motion and neck supple.      Right lower leg: No edema.      Left lower leg: No edema.   Lymphadenopathy:      Cervical: No cervical adenopathy.   Skin:     General: Skin is warm and dry.      Findings: No rash.      Nails: There is no clubbing.   Neurological:      Mental Status: She is alert and oriented to person, place, and time.      Cranial Nerves: No cranial nerve deficit.      Gait: Gait normal.   Psychiatric:         Mood and Affect: Mood normal.         Behavior: Behavior normal.         PFTs as reviewed by me personally:  As per hPI    Imaging as reviewed by me personally:  As per HPI    Assessment:  1. Chronic obstructive pulmonary disease, unspecified COPD type (HCC)  Multiple Oximetry    CT-CHEST (THORAX) W/O   2. Nocturnal hypoxia  Overnight Oximetry   3. Moderate persistent asthma without complication  IGE SERUM    CBC WITH DIFFERENTIAL   4. Pulmonary nodules  CT-CHEST (THORAX) W/O   5. Tobacco use         Plan:  1.  This is a chronic diagnosis but I suspect significant reactive airways disease component.  PFTs are relatively stable since her last visit.  She was counseled on complete tobacco cessation.  She is up-to-date on  vaccines.  No desaturation on ambulatory oximetry today.  We will work her up for allergic asthma as per below and continue Advair 500, incruse, montelukast and bronchodilators.  2.  Historical the patient no longer using oxygen.  She did not have nocturnal symptoms when she was on oxygen.  I have ordered overnight oximetry on room air.  No clinical suspicion for sleep apnea.  3.  Patient is presenting with symptoms more consistent with poorly controlled asthma although may be related to reflux.  Ordered CBC with differential for eosinophils, IgE level and continue therapy as outlined above.  4.  Historical diagnosis but I would like to obtain CT chest to confirm or refute and evaluate for any potential underlying abnormalities that could be contributing to her presentation.  5.  Patient was counseled on complete cessation.  Not yet a candidate for lung cancer screening.  Return in about 5 months (around 2/14/2022) for labs, ct chest, opo.

## 2021-09-14 NOTE — PROCEDURES
Multi-Ox Readings  Multi Ox #1 Room air   O2 sat % at rest 99   O2 sat % on exertion 93   O2 sat average on exertion 96   Multi Ox #2     O2 sat % at rest     O2 sat % on exertion     O2 sat average on exertion       Oxygen Use     Oxygen Frequency     Duration of need     Is the patient mobile within the home?     CPAP Use?     BIPAP Use?     Servo Titration

## 2021-09-14 NOTE — PROCEDURES
Tech: Patricia Hull, RRT, CPFT  Tech notes: Good patient effort & cooperation.  Audible wheezing during all maneuvers.  Except DLCO IVC less than 90% of VC, the results of this test meet the ATS/ERS standards for acceptability & reproducibility.  Test was performed on the [employer] Body Plethysmograph-Elite DX system.  Predicted values were GLI-2012 for spirometry, GLI- 2017 for DLCO, ITS for Lung Volumes.  The DLCO was uncorrected for Hgb.  A bronchodilator of Ventolin HFA -2puffs via spacer administered.  DLCO performed during dilation period.    Interpretation:  Baseline spirometry shows airflow obstruction with FEV1/FVC ratio 62 and an FEV1 of 1.7 L or 60% predicted.  No significant bronchodilator response is seen.  Total lung capacity is within normal limits at 5.25 L or 103% predicted.  There is air trapping with RV of 147% predicted.  Diffusion capacity is lower limits of normal at 80% predicted.  Pulmonary function testing shows airflow obstruction with air trapping and reduced DLCO consistent with stated diagnosis of COPD.  Flow volume loop confirms this.

## 2021-12-27 ENCOUNTER — TELEPHONE (OUTPATIENT)
Dept: SCHEDULING | Facility: IMAGING CENTER | Age: 46
End: 2021-12-27

## 2021-12-27 NOTE — TELEPHONE ENCOUNTER
As of January 1st the patient will have [employer] as her insurance. Can you please send a referral to [facility] for her.    Thank you,  Danelle REVELES

## 2022-02-10 ENCOUNTER — TELEPHONE (OUTPATIENT)
Dept: SLEEP MEDICINE | Facility: MEDICAL CENTER | Age: 47
End: 2022-02-10

## 2022-02-10 DIAGNOSIS — G47.34 NOCTURNAL HYPOXIA: ICD-10-CM

## 2022-02-10 DIAGNOSIS — J44.9 CHRONIC OBSTRUCTIVE PULMONARY DISEASE, UNSPECIFIED COPD TYPE (HCC): ICD-10-CM

## 2022-02-10 DIAGNOSIS — J45.40 MODERATE PERSISTENT ASTHMA WITHOUT COMPLICATION: ICD-10-CM

## 2022-02-10 DIAGNOSIS — R91.8 PULMONARY NODULES: ICD-10-CM

## 2022-02-10 NOTE — TELEPHONE ENCOUNTER
Patient has an upcoming appt with Dr. Cobos on 2/17/2022, but they have switched to a [facility] [facility] Medicare Ad[employer] plan, so they can no longer continue care with our office. Even if it was through a former employer, [facility] doesn't accept these plans because [facility] is partnered with [employer].  will cancel the patient's appt, but could an outgoing referral be put in so that patient can establish with someone in-network, please?

## 2022-02-15 DIAGNOSIS — E78.2 MIXED HYPERLIPIDEMIA: ICD-10-CM

## 2022-02-15 DIAGNOSIS — E78.5 HYPERLIPIDEMIA, UNSPECIFIED HYPERLIPIDEMIA TYPE: ICD-10-CM

## 2022-02-16 NOTE — TELEPHONE ENCOUNTER
Received request via: Pharmacy    Was the patient seen in the last year in this department? Yes    Does the patient have an active prescription (recently filled or refills available) for medication(s) requested? No       Pt insurance allows 100-day supply.

## 2022-02-17 ENCOUNTER — APPOINTMENT (OUTPATIENT)
Dept: SLEEP MEDICINE | Facility: MEDICAL CENTER | Age: 47
End: 2022-02-17
Payer: MEDICARE

## 2022-02-17 PROBLEM — M75.22 BICEPS TENDONITIS, LEFT: Status: ACTIVE | Noted: 2022-02-17

## 2022-02-17 PROBLEM — M75.42 SUBACROMIAL IMPINGEMENT OF LEFT SHOULDER: Status: ACTIVE | Noted: 2022-02-17

## 2022-02-17 PROBLEM — M75.122 COMPLETE ROTATOR CUFF TEAR OF LEFT SHOULDER: Status: ACTIVE | Noted: 2022-02-17

## 2022-02-17 RX ORDER — SIMVASTATIN 40 MG
40 TABLET ORAL EVERY EVENING
Qty: 100 TABLET | Refills: 1 | Status: SHIPPED | OUTPATIENT
Start: 2022-02-17

## 2022-11-03 ENCOUNTER — PATIENT MESSAGE (OUTPATIENT)
Dept: HEALTH INFORMATION MANAGEMENT | Facility: OTHER | Age: 47
End: 2022-11-03